# Patient Record
Sex: MALE | Race: WHITE | Employment: FULL TIME | URBAN - METROPOLITAN AREA
[De-identification: names, ages, dates, MRNs, and addresses within clinical notes are randomized per-mention and may not be internally consistent; named-entity substitution may affect disease eponyms.]

---

## 2018-03-02 ENCOUNTER — APPOINTMENT (OUTPATIENT)
Dept: CARDIOLOGY | Facility: CLINIC | Age: 63
DRG: 246 | End: 2018-03-02
Attending: INTERNAL MEDICINE
Payer: COMMERCIAL

## 2018-03-02 ENCOUNTER — APPOINTMENT (OUTPATIENT)
Dept: CARDIOLOGY | Facility: CLINIC | Age: 63
DRG: 311 | End: 2018-03-02
Attending: PHYSICIAN ASSISTANT
Payer: COMMERCIAL

## 2018-03-02 ENCOUNTER — HOSPITAL ENCOUNTER (INPATIENT)
Facility: CLINIC | Age: 63
LOS: 1 days | Discharge: ANOTHER HOSPITAL | DRG: 311 | End: 2018-03-02
Attending: EMERGENCY MEDICINE | Admitting: INTERNAL MEDICINE
Payer: COMMERCIAL

## 2018-03-02 ENCOUNTER — HOSPITAL ENCOUNTER (INPATIENT)
Facility: CLINIC | Age: 63
LOS: 1 days | Discharge: HOME | DRG: 246 | End: 2018-03-03
Attending: HOSPITALIST | Admitting: HOSPITALIST
Payer: COMMERCIAL

## 2018-03-02 ENCOUNTER — APPOINTMENT (OUTPATIENT)
Dept: GENERAL RADIOLOGY | Facility: CLINIC | Age: 63
DRG: 311 | End: 2018-03-02
Attending: EMERGENCY MEDICINE
Payer: COMMERCIAL

## 2018-03-02 VITALS
TEMPERATURE: 97.9 F | OXYGEN SATURATION: 95 % | SYSTOLIC BLOOD PRESSURE: 110 MMHG | HEIGHT: 68 IN | BODY MASS INDEX: 30.08 KG/M2 | WEIGHT: 198.5 LBS | RESPIRATION RATE: 18 BRPM | DIASTOLIC BLOOD PRESSURE: 77 MMHG

## 2018-03-02 DIAGNOSIS — I20.0 UNSTABLE ANGINA (H): ICD-10-CM

## 2018-03-02 DIAGNOSIS — I21.4 NSTEMI (NON-ST ELEVATED MYOCARDIAL INFARCTION) (H): ICD-10-CM

## 2018-03-02 DIAGNOSIS — R07.9 CHEST PAIN, UNSPECIFIED TYPE: ICD-10-CM

## 2018-03-02 DIAGNOSIS — Z98.61 POSTSURGICAL PERCUTANEOUS TRANSLUMINAL CORONARY ANGIOPLASTY STATUS: Primary | ICD-10-CM

## 2018-03-02 DIAGNOSIS — R07.89 OTHER CHEST PAIN: ICD-10-CM

## 2018-03-02 PROBLEM — I24.9 ACS (ACUTE CORONARY SYNDROME) (H): Status: ACTIVE | Noted: 2018-03-02

## 2018-03-02 LAB
ANION GAP SERPL CALCULATED.3IONS-SCNC: 3 MMOL/L (ref 3–14)
BASOPHILS # BLD AUTO: 0 10E9/L (ref 0–0.2)
BASOPHILS NFR BLD AUTO: 0.7 %
BUN SERPL-MCNC: 20 MG/DL (ref 7–30)
CALCIUM SERPL-MCNC: 9.3 MG/DL (ref 8.5–10.1)
CHLORIDE SERPL-SCNC: 107 MMOL/L (ref 94–109)
CO2 SERPL-SCNC: 31 MMOL/L (ref 20–32)
CREAT SERPL-MCNC: 0.99 MG/DL (ref 0.66–1.25)
DIFFERENTIAL METHOD BLD: NORMAL
EOSINOPHIL # BLD AUTO: 0.2 10E9/L (ref 0–0.7)
EOSINOPHIL NFR BLD AUTO: 3.8 %
ERYTHROCYTE [DISTWIDTH] IN BLOOD BY AUTOMATED COUNT: 13.1 % (ref 10–15)
ERYTHROCYTE [DISTWIDTH] IN BLOOD BY AUTOMATED COUNT: 13.1 % (ref 10–15)
GFR SERPL CREATININE-BSD FRML MDRD: 77 ML/MIN/1.7M2
GLUCOSE SERPL-MCNC: 92 MG/DL (ref 70–99)
HCT VFR BLD AUTO: 44.7 % (ref 40–53)
HCT VFR BLD AUTO: 47.4 % (ref 40–53)
HGB BLD-MCNC: 14.5 G/DL (ref 13.3–17.7)
HGB BLD-MCNC: 15.5 G/DL (ref 13.3–17.7)
IMM GRANULOCYTES # BLD: 0 10E9/L (ref 0–0.4)
IMM GRANULOCYTES NFR BLD: 0.2 %
INTERPRETATION ECG - MUSE: NORMAL
INTERPRETATION ECG - MUSE: NORMAL
KCT BLD-ACNC: 242 SEC (ref 75–150)
KCT BLD-ACNC: 248 SEC (ref 75–150)
KCT BLD-ACNC: 254 SEC (ref 75–150)
KCT BLD-ACNC: 272 SEC (ref 75–150)
KCT BLD-ACNC: 361 SEC (ref 75–150)
LYMPHOCYTES # BLD AUTO: 1.8 10E9/L (ref 0.8–5.3)
LYMPHOCYTES NFR BLD AUTO: 30.1 %
MCH RBC QN AUTO: 28.9 PG (ref 26.5–33)
MCH RBC QN AUTO: 28.9 PG (ref 26.5–33)
MCHC RBC AUTO-ENTMCNC: 32.4 G/DL (ref 31.5–36.5)
MCHC RBC AUTO-ENTMCNC: 32.7 G/DL (ref 31.5–36.5)
MCV RBC AUTO: 88 FL (ref 78–100)
MCV RBC AUTO: 89 FL (ref 78–100)
MONOCYTES # BLD AUTO: 0.6 10E9/L (ref 0–1.3)
MONOCYTES NFR BLD AUTO: 9.7 %
NEUTROPHILS # BLD AUTO: 3.3 10E9/L (ref 1.6–8.3)
NEUTROPHILS NFR BLD AUTO: 55.5 %
NRBC # BLD AUTO: 0 10*3/UL
NRBC BLD AUTO-RTO: 0 /100
PLATELET # BLD AUTO: 282 10E9/L (ref 150–450)
PLATELET # BLD AUTO: 314 10E9/L (ref 150–450)
POTASSIUM SERPL-SCNC: 4 MMOL/L (ref 3.4–5.3)
RBC # BLD AUTO: 5.02 10E12/L (ref 4.4–5.9)
RBC # BLD AUTO: 5.36 10E12/L (ref 4.4–5.9)
SODIUM SERPL-SCNC: 141 MMOL/L (ref 133–144)
TROPONIN I BLD-MCNC: 0.01 UG/L (ref 0–0.1)
TROPONIN I SERPL-MCNC: 0.06 UG/L (ref 0–0.04)
TROPONIN I SERPL-MCNC: 0.08 UG/L (ref 0–0.04)
WBC # BLD AUTO: 5.6 10E9/L (ref 4–11)
WBC # BLD AUTO: 6 10E9/L (ref 4–11)

## 2018-03-02 PROCEDURE — C1874 STENT, COATED/COV W/DEL SYS: HCPCS

## 2018-03-02 PROCEDURE — 80048 BASIC METABOLIC PNL TOTAL CA: CPT | Performed by: EMERGENCY MEDICINE

## 2018-03-02 PROCEDURE — 27211089 ZZH KIT ACIST INJECTOR CR3

## 2018-03-02 PROCEDURE — 27210744 ZZH CATH CR12

## 2018-03-02 PROCEDURE — 25000132 ZZH RX MED GY IP 250 OP 250 PS 637: Performed by: EMERGENCY MEDICINE

## 2018-03-02 PROCEDURE — 93458 L HRT ARTERY/VENTRICLE ANGIO: CPT | Mod: XU

## 2018-03-02 PROCEDURE — 99207 ZZC CDG-MDM COMPONENT: MEETS MODERATE - UP CODED: CPT | Performed by: HOSPITALIST

## 2018-03-02 PROCEDURE — 84484 ASSAY OF TROPONIN QUANT: CPT | Performed by: PHYSICIAN ASSISTANT

## 2018-03-02 PROCEDURE — C1769 GUIDE WIRE: HCPCS

## 2018-03-02 PROCEDURE — 99152 MOD SED SAME PHYS/QHP 5/>YRS: CPT | Mod: GC | Performed by: INTERNAL MEDICINE

## 2018-03-02 PROCEDURE — 84484 ASSAY OF TROPONIN QUANT: CPT

## 2018-03-02 PROCEDURE — 93005 ELECTROCARDIOGRAM TRACING: CPT

## 2018-03-02 PROCEDURE — 85027 COMPLETE CBC AUTOMATED: CPT | Performed by: PHYSICIAN ASSISTANT

## 2018-03-02 PROCEDURE — C9600 PERC DRUG-EL COR STENT SING: HCPCS

## 2018-03-02 PROCEDURE — 25000132 ZZH RX MED GY IP 250 OP 250 PS 637

## 2018-03-02 PROCEDURE — 27210787 ZZH MANIFOLD CR2

## 2018-03-02 PROCEDURE — 99152 MOD SED SAME PHYS/QHP 5/>YRS: CPT

## 2018-03-02 PROCEDURE — 99223 1ST HOSP IP/OBS HIGH 75: CPT | Mod: 24 | Performed by: INTERNAL MEDICINE

## 2018-03-02 PROCEDURE — 25000128 H RX IP 250 OP 636: Performed by: INTERNAL MEDICINE

## 2018-03-02 PROCEDURE — 96365 THER/PROPH/DIAG IV INF INIT: CPT

## 2018-03-02 PROCEDURE — 93306 TTE W/DOPPLER COMPLETE: CPT | Mod: 26 | Performed by: INTERNAL MEDICINE

## 2018-03-02 PROCEDURE — 84484 ASSAY OF TROPONIN QUANT: CPT | Performed by: EMERGENCY MEDICINE

## 2018-03-02 PROCEDURE — 85347 COAGULATION TIME ACTIVATED: CPT

## 2018-03-02 PROCEDURE — 027037Z DILATION OF CORONARY ARTERY, ONE ARTERY WITH FOUR OR MORE DRUG-ELUTING INTRALUMINAL DEVICES, PERCUTANEOUS APPROACH: ICD-10-PCS | Performed by: INTERNAL MEDICINE

## 2018-03-02 PROCEDURE — 27210998 ZZH ACCESS HEART CATH CR6

## 2018-03-02 PROCEDURE — 27210759 ZZH DEVICE INFLATION CR6

## 2018-03-02 PROCEDURE — 92928 PRQ TCAT PLMT NTRAC ST 1 LES: CPT | Mod: RC | Performed by: INTERNAL MEDICINE

## 2018-03-02 PROCEDURE — 85025 COMPLETE CBC W/AUTO DIFF WBC: CPT | Performed by: EMERGENCY MEDICINE

## 2018-03-02 PROCEDURE — C1725 CATH, TRANSLUMIN NON-LASER: HCPCS

## 2018-03-02 PROCEDURE — 99153 MOD SED SAME PHYS/QHP EA: CPT

## 2018-03-02 PROCEDURE — 25000128 H RX IP 250 OP 636: Performed by: EMERGENCY MEDICINE

## 2018-03-02 PROCEDURE — 21000000 ZZH R&B IMCU HEART CARE

## 2018-03-02 PROCEDURE — 27210795 ZZH PAD DEFIB QUICK CR4

## 2018-03-02 PROCEDURE — 36415 COLL VENOUS BLD VENIPUNCTURE: CPT | Performed by: PHYSICIAN ASSISTANT

## 2018-03-02 PROCEDURE — 96375 TX/PRO/DX INJ NEW DRUG ADDON: CPT

## 2018-03-02 PROCEDURE — 99223 1ST HOSP IP/OBS HIGH 75: CPT | Mod: AI | Performed by: HOSPITALIST

## 2018-03-02 PROCEDURE — 27210946 ZZH KIT HC TOTES DISP CR8

## 2018-03-02 PROCEDURE — 25000128 H RX IP 250 OP 636

## 2018-03-02 PROCEDURE — 99285 EMERGENCY DEPT VISIT HI MDM: CPT | Mod: 25

## 2018-03-02 PROCEDURE — 93458 L HRT ARTERY/VENTRICLE ANGIO: CPT | Mod: 26 | Performed by: INTERNAL MEDICINE

## 2018-03-02 PROCEDURE — 27210856 ZZH ACCESS HEART CATH CR2

## 2018-03-02 PROCEDURE — 4A023N7 MEASUREMENT OF CARDIAC SAMPLING AND PRESSURE, LEFT HEART, PERCUTANEOUS APPROACH: ICD-10-PCS | Performed by: INTERNAL MEDICINE

## 2018-03-02 PROCEDURE — 12000007 ZZH R&B INTERMEDIATE

## 2018-03-02 PROCEDURE — 93306 TTE W/DOPPLER COMPLETE: CPT

## 2018-03-02 PROCEDURE — 25000132 ZZH RX MED GY IP 250 OP 250 PS 637: Performed by: PHYSICIAN ASSISTANT

## 2018-03-02 PROCEDURE — 93005 ELECTROCARDIOGRAM TRACING: CPT | Mod: 76

## 2018-03-02 PROCEDURE — 25000132 ZZH RX MED GY IP 250 OP 250 PS 637: Performed by: INTERNAL MEDICINE

## 2018-03-02 PROCEDURE — C1887 CATHETER, GUIDING: HCPCS

## 2018-03-02 PROCEDURE — B2111ZZ FLUOROSCOPY OF MULTIPLE CORONARY ARTERIES USING LOW OSMOLAR CONTRAST: ICD-10-PCS | Performed by: INTERNAL MEDICINE

## 2018-03-02 PROCEDURE — 71045 X-RAY EXAM CHEST 1 VIEW: CPT

## 2018-03-02 RX ORDER — NITROGLYCERIN 5 MG/ML
100-500 VIAL (ML) INTRAVENOUS
Status: DISCONTINUED | OUTPATIENT
Start: 2018-03-02 | End: 2018-03-02 | Stop reason: HOSPADM

## 2018-03-02 RX ORDER — NITROGLYCERIN 0.4 MG/1
0.4 TABLET SUBLINGUAL EVERY 5 MIN PRN
Status: DISCONTINUED | OUTPATIENT
Start: 2018-03-02 | End: 2018-03-03 | Stop reason: HOSPADM

## 2018-03-02 RX ORDER — POTASSIUM CHLORIDE 29.8 MG/ML
20 INJECTION INTRAVENOUS
Status: DISCONTINUED | OUTPATIENT
Start: 2018-03-02 | End: 2018-03-02 | Stop reason: HOSPADM

## 2018-03-02 RX ORDER — POTASSIUM CHLORIDE 1500 MG/1
20 TABLET, EXTENDED RELEASE ORAL
Status: DISCONTINUED | OUTPATIENT
Start: 2018-03-02 | End: 2018-03-02 | Stop reason: HOSPADM

## 2018-03-02 RX ORDER — ASPIRIN 81 MG/1
81-324 TABLET, CHEWABLE ORAL
Status: DISCONTINUED | OUTPATIENT
Start: 2018-03-02 | End: 2018-03-02 | Stop reason: HOSPADM

## 2018-03-02 RX ORDER — AMOXICILLIN 250 MG
2 CAPSULE ORAL 2 TIMES DAILY PRN
Status: DISCONTINUED | OUTPATIENT
Start: 2018-03-02 | End: 2018-03-02 | Stop reason: HOSPADM

## 2018-03-02 RX ORDER — AMOXICILLIN 250 MG
1 CAPSULE ORAL 2 TIMES DAILY PRN
Status: DISCONTINUED | OUTPATIENT
Start: 2018-03-02 | End: 2018-03-02 | Stop reason: HOSPADM

## 2018-03-02 RX ORDER — NITROGLYCERIN 0.4 MG/1
0.4 TABLET SUBLINGUAL EVERY 5 MIN PRN
Status: DISCONTINUED | OUTPATIENT
Start: 2018-03-02 | End: 2018-03-02

## 2018-03-02 RX ORDER — NALOXONE HYDROCHLORIDE 0.4 MG/ML
.2-.4 INJECTION, SOLUTION INTRAMUSCULAR; INTRAVENOUS; SUBCUTANEOUS
Status: DISCONTINUED | OUTPATIENT
Start: 2018-03-02 | End: 2018-03-03

## 2018-03-02 RX ORDER — LIDOCAINE 40 MG/G
CREAM TOPICAL
Status: DISCONTINUED | OUTPATIENT
Start: 2018-03-02 | End: 2018-03-03 | Stop reason: HOSPADM

## 2018-03-02 RX ORDER — NICARDIPINE HYDROCHLORIDE 2.5 MG/ML
100 INJECTION INTRAVENOUS
Status: DISCONTINUED | OUTPATIENT
Start: 2018-03-02 | End: 2018-03-02 | Stop reason: HOSPADM

## 2018-03-02 RX ORDER — BUPIVACAINE HYDROCHLORIDE 2.5 MG/ML
1-10 INJECTION, SOLUTION EPIDURAL; INFILTRATION; INTRACAUDAL
Status: DISCONTINUED | OUTPATIENT
Start: 2018-03-02 | End: 2018-03-02 | Stop reason: HOSPADM

## 2018-03-02 RX ORDER — FLUMAZENIL 0.1 MG/ML
0.2 INJECTION, SOLUTION INTRAVENOUS
Status: DISCONTINUED | OUTPATIENT
Start: 2018-03-02 | End: 2018-03-02 | Stop reason: HOSPADM

## 2018-03-02 RX ORDER — SODIUM CHLORIDE 9 MG/ML
INJECTION, SOLUTION INTRAVENOUS CONTINUOUS
Status: ACTIVE | OUTPATIENT
Start: 2018-03-02 | End: 2018-03-03

## 2018-03-02 RX ORDER — ACETAMINOPHEN 325 MG/1
325-650 TABLET ORAL EVERY 4 HOURS PRN
Status: DISCONTINUED | OUTPATIENT
Start: 2018-03-02 | End: 2018-03-03 | Stop reason: HOSPADM

## 2018-03-02 RX ORDER — LISINOPRIL 10 MG/1
10 TABLET ORAL DAILY
Status: DISCONTINUED | OUTPATIENT
Start: 2018-03-02 | End: 2018-03-02 | Stop reason: HOSPADM

## 2018-03-02 RX ORDER — PROCHLORPERAZINE MALEATE 10 MG
10 TABLET ORAL EVERY 6 HOURS PRN
Status: DISCONTINUED | OUTPATIENT
Start: 2018-03-02 | End: 2018-03-02 | Stop reason: HOSPADM

## 2018-03-02 RX ORDER — ENALAPRILAT 1.25 MG/ML
1.25-2.5 INJECTION INTRAVENOUS
Status: DISCONTINUED | OUTPATIENT
Start: 2018-03-02 | End: 2018-03-02 | Stop reason: HOSPADM

## 2018-03-02 RX ORDER — ACETAMINOPHEN 325 MG/1
650 TABLET ORAL EVERY 4 HOURS PRN
Status: DISCONTINUED | OUTPATIENT
Start: 2018-03-02 | End: 2018-03-02 | Stop reason: HOSPADM

## 2018-03-02 RX ORDER — FUROSEMIDE 10 MG/ML
20-100 INJECTION INTRAMUSCULAR; INTRAVENOUS
Status: DISCONTINUED | OUTPATIENT
Start: 2018-03-02 | End: 2018-03-02 | Stop reason: HOSPADM

## 2018-03-02 RX ORDER — MULTIPLE VITAMINS W/ MINERALS TAB 9MG-400MCG
1 TAB ORAL DAILY
COMMUNITY

## 2018-03-02 RX ORDER — LORAZEPAM 2 MG/ML
.5-2 INJECTION INTRAMUSCULAR EVERY 4 HOURS PRN
Status: DISCONTINUED | OUTPATIENT
Start: 2018-03-02 | End: 2018-03-02 | Stop reason: HOSPADM

## 2018-03-02 RX ORDER — ASPIRIN 81 MG/1
324 TABLET, CHEWABLE ORAL ONCE
Status: COMPLETED | OUTPATIENT
Start: 2018-03-02 | End: 2018-03-02

## 2018-03-02 RX ORDER — CARVEDILOL 6.25 MG/1
6.25 TABLET ORAL 2 TIMES DAILY
Status: DISCONTINUED | OUTPATIENT
Start: 2018-03-02 | End: 2018-03-02 | Stop reason: HOSPADM

## 2018-03-02 RX ORDER — METOPROLOL SUCCINATE 25 MG/1
25 TABLET, EXTENDED RELEASE ORAL DAILY
Status: DISCONTINUED | OUTPATIENT
Start: 2018-03-03 | End: 2018-03-03 | Stop reason: HOSPADM

## 2018-03-02 RX ORDER — ALUMINA, MAGNESIA, AND SIMETHICONE 2400; 2400; 240 MG/30ML; MG/30ML; MG/30ML
30 SUSPENSION ORAL EVERY 4 HOURS PRN
Status: DISCONTINUED | OUTPATIENT
Start: 2018-03-02 | End: 2018-03-02 | Stop reason: HOSPADM

## 2018-03-02 RX ORDER — ONDANSETRON 4 MG/1
4 TABLET, ORALLY DISINTEGRATING ORAL EVERY 6 HOURS PRN
Status: DISCONTINUED | OUTPATIENT
Start: 2018-03-02 | End: 2018-03-02 | Stop reason: HOSPADM

## 2018-03-02 RX ORDER — NITROGLYCERIN 20 MG/100ML
.07-2 INJECTION INTRAVENOUS CONTINUOUS PRN
Status: DISCONTINUED | OUTPATIENT
Start: 2018-03-02 | End: 2018-03-02 | Stop reason: HOSPADM

## 2018-03-02 RX ORDER — CLOPIDOGREL 300 MG/1
300-600 TABLET, FILM COATED ORAL
Status: DISCONTINUED | OUTPATIENT
Start: 2018-03-02 | End: 2018-03-02 | Stop reason: HOSPADM

## 2018-03-02 RX ORDER — LIDOCAINE 40 MG/G
CREAM TOPICAL
Status: DISCONTINUED | OUTPATIENT
Start: 2018-03-02 | End: 2018-03-02 | Stop reason: HOSPADM

## 2018-03-02 RX ORDER — ADENOSINE 3 MG/ML
12-12000 INJECTION, SOLUTION INTRAVENOUS
Status: DISCONTINUED | OUTPATIENT
Start: 2018-03-02 | End: 2018-03-02 | Stop reason: HOSPADM

## 2018-03-02 RX ORDER — NITROGLYCERIN 5 MG/ML
VIAL (ML) INTRAVENOUS
Status: DISCONTINUED
Start: 2018-03-02 | End: 2018-03-02 | Stop reason: HOSPADM

## 2018-03-02 RX ORDER — SIMVASTATIN 20 MG
20 TABLET ORAL EVERY EVENING
Status: DISCONTINUED | OUTPATIENT
Start: 2018-03-02 | End: 2018-03-02 | Stop reason: HOSPADM

## 2018-03-02 RX ORDER — ATORVASTATIN CALCIUM 40 MG/1
40 TABLET, FILM COATED ORAL DAILY
Status: DISCONTINUED | OUTPATIENT
Start: 2018-03-03 | End: 2018-03-03

## 2018-03-02 RX ORDER — HEPARIN SODIUM 1000 [USP'U]/ML
INJECTION, SOLUTION INTRAVENOUS; SUBCUTANEOUS
Status: DISCONTINUED
Start: 2018-03-02 | End: 2018-03-02 | Stop reason: HOSPADM

## 2018-03-02 RX ORDER — NALOXONE HYDROCHLORIDE 0.4 MG/ML
.1-.4 INJECTION, SOLUTION INTRAMUSCULAR; INTRAVENOUS; SUBCUTANEOUS
Status: DISCONTINUED | OUTPATIENT
Start: 2018-03-02 | End: 2018-03-03 | Stop reason: HOSPADM

## 2018-03-02 RX ORDER — NIFEDIPINE 10 MG/1
10 CAPSULE ORAL
Status: DISCONTINUED | OUTPATIENT
Start: 2018-03-02 | End: 2018-03-02 | Stop reason: HOSPADM

## 2018-03-02 RX ORDER — ASPIRIN 81 MG/1
81 TABLET ORAL DAILY
Status: DISCONTINUED | OUTPATIENT
Start: 2018-03-03 | End: 2018-03-03 | Stop reason: HOSPADM

## 2018-03-02 RX ORDER — PROMETHAZINE HYDROCHLORIDE 25 MG/ML
6.25-25 INJECTION, SOLUTION INTRAMUSCULAR; INTRAVENOUS EVERY 4 HOURS PRN
Status: DISCONTINUED | OUTPATIENT
Start: 2018-03-02 | End: 2018-03-02 | Stop reason: HOSPADM

## 2018-03-02 RX ORDER — CLOPIDOGREL BISULFATE 75 MG/1
75 TABLET ORAL
Status: DISCONTINUED | OUTPATIENT
Start: 2018-03-02 | End: 2018-03-02 | Stop reason: HOSPADM

## 2018-03-02 RX ORDER — HEPARIN SODIUM 1000 [USP'U]/ML
1000-10000 INJECTION, SOLUTION INTRAVENOUS; SUBCUTANEOUS EVERY 5 MIN PRN
Status: DISCONTINUED | OUTPATIENT
Start: 2018-03-02 | End: 2018-03-02 | Stop reason: HOSPADM

## 2018-03-02 RX ORDER — ONDANSETRON 2 MG/ML
4 INJECTION INTRAMUSCULAR; INTRAVENOUS EVERY 4 HOURS PRN
Status: DISCONTINUED | OUTPATIENT
Start: 2018-03-02 | End: 2018-03-02 | Stop reason: HOSPADM

## 2018-03-02 RX ORDER — IOPAMIDOL 755 MG/ML
280 INJECTION, SOLUTION INTRAVASCULAR ONCE
Status: COMPLETED | OUTPATIENT
Start: 2018-03-02 | End: 2018-03-02

## 2018-03-02 RX ORDER — NITROGLYCERIN 0.4 MG/1
0.4 TABLET SUBLINGUAL EVERY 5 MIN PRN
Status: DISCONTINUED | OUTPATIENT
Start: 2018-03-02 | End: 2018-03-02 | Stop reason: HOSPADM

## 2018-03-02 RX ORDER — HYDRALAZINE HYDROCHLORIDE 20 MG/ML
10-20 INJECTION INTRAMUSCULAR; INTRAVENOUS
Status: DISCONTINUED | OUTPATIENT
Start: 2018-03-02 | End: 2018-03-02 | Stop reason: HOSPADM

## 2018-03-02 RX ORDER — ASPIRIN 325 MG
325 TABLET ORAL DAILY
Status: DISCONTINUED | OUTPATIENT
Start: 2018-03-03 | End: 2018-03-02

## 2018-03-02 RX ORDER — POTASSIUM CHLORIDE 7.45 MG/ML
10 INJECTION INTRAVENOUS
Status: DISCONTINUED | OUTPATIENT
Start: 2018-03-02 | End: 2018-03-02 | Stop reason: HOSPADM

## 2018-03-02 RX ORDER — POLYETHYLENE GLYCOL 3350 17 G/17G
17 POWDER, FOR SOLUTION ORAL DAILY PRN
Status: DISCONTINUED | OUTPATIENT
Start: 2018-03-02 | End: 2018-03-02 | Stop reason: HOSPADM

## 2018-03-02 RX ORDER — NITROGLYCERIN 5 MG/ML
100-200 VIAL (ML) INTRAVENOUS
Status: DISCONTINUED | OUTPATIENT
Start: 2018-03-02 | End: 2018-03-02 | Stop reason: HOSPADM

## 2018-03-02 RX ORDER — ACETAMINOPHEN 650 MG/1
650 SUPPOSITORY RECTAL EVERY 4 HOURS PRN
Status: DISCONTINUED | OUTPATIENT
Start: 2018-03-02 | End: 2018-03-02 | Stop reason: HOSPADM

## 2018-03-02 RX ORDER — LORAZEPAM 2 MG/ML
0.5 INJECTION INTRAMUSCULAR
Status: DISCONTINUED | OUTPATIENT
Start: 2018-03-02 | End: 2018-03-02 | Stop reason: HOSPADM

## 2018-03-02 RX ORDER — DOPAMINE HYDROCHLORIDE 160 MG/100ML
2-20 INJECTION, SOLUTION INTRAVENOUS CONTINUOUS PRN
Status: DISCONTINUED | OUTPATIENT
Start: 2018-03-02 | End: 2018-03-02 | Stop reason: HOSPADM

## 2018-03-02 RX ORDER — EPINEPHRINE 1 MG/ML
0.3 INJECTION, SOLUTION, CONCENTRATE INTRAVENOUS
Status: DISCONTINUED | OUTPATIENT
Start: 2018-03-02 | End: 2018-03-02 | Stop reason: HOSPADM

## 2018-03-02 RX ORDER — LORAZEPAM 0.5 MG/1
0.5 TABLET ORAL
Status: DISCONTINUED | OUTPATIENT
Start: 2018-03-02 | End: 2018-03-02 | Stop reason: HOSPADM

## 2018-03-02 RX ORDER — LIDOCAINE HYDROCHLORIDE 10 MG/ML
30 INJECTION, SOLUTION EPIDURAL; INFILTRATION; INTRACAUDAL; PERINEURAL
Status: DISCONTINUED | OUTPATIENT
Start: 2018-03-02 | End: 2018-03-02 | Stop reason: HOSPADM

## 2018-03-02 RX ORDER — ONDANSETRON 2 MG/ML
4 INJECTION INTRAMUSCULAR; INTRAVENOUS EVERY 6 HOURS PRN
Status: DISCONTINUED | OUTPATIENT
Start: 2018-03-02 | End: 2018-03-02 | Stop reason: HOSPADM

## 2018-03-02 RX ORDER — FENTANYL CITRATE 50 UG/ML
25-50 INJECTION, SOLUTION INTRAMUSCULAR; INTRAVENOUS
Status: DISCONTINUED | OUTPATIENT
Start: 2018-03-02 | End: 2018-03-02 | Stop reason: HOSPADM

## 2018-03-02 RX ORDER — NALOXONE HYDROCHLORIDE 0.4 MG/ML
.1-.4 INJECTION, SOLUTION INTRAMUSCULAR; INTRAVENOUS; SUBCUTANEOUS
Status: DISCONTINUED | OUTPATIENT
Start: 2018-03-02 | End: 2018-03-02 | Stop reason: HOSPADM

## 2018-03-02 RX ORDER — PROTAMINE SULFATE 10 MG/ML
25-100 INJECTION, SOLUTION INTRAVENOUS EVERY 5 MIN PRN
Status: DISCONTINUED | OUTPATIENT
Start: 2018-03-02 | End: 2018-03-02 | Stop reason: HOSPADM

## 2018-03-02 RX ORDER — LIDOCAINE HYDROCHLORIDE 10 MG/ML
1-10 INJECTION, SOLUTION EPIDURAL; INFILTRATION; INTRACAUDAL; PERINEURAL
Status: DISCONTINUED | OUTPATIENT
Start: 2018-03-02 | End: 2018-03-02 | Stop reason: HOSPADM

## 2018-03-02 RX ORDER — NICOTINE POLACRILEX 4 MG/1
20 GUM, CHEWING ORAL DAILY
COMMUNITY

## 2018-03-02 RX ORDER — PROTAMINE SULFATE 10 MG/ML
1-5 INJECTION, SOLUTION INTRAVENOUS
Status: DISCONTINUED | OUTPATIENT
Start: 2018-03-02 | End: 2018-03-02 | Stop reason: HOSPADM

## 2018-03-02 RX ORDER — ATROPINE SULFATE 0.1 MG/ML
.5-1 INJECTION INTRAVENOUS
Status: DISCONTINUED | OUTPATIENT
Start: 2018-03-02 | End: 2018-03-02 | Stop reason: HOSPADM

## 2018-03-02 RX ORDER — METOPROLOL TARTRATE 1 MG/ML
5 INJECTION, SOLUTION INTRAVENOUS EVERY 5 MIN PRN
Status: DISCONTINUED | OUTPATIENT
Start: 2018-03-02 | End: 2018-03-02 | Stop reason: HOSPADM

## 2018-03-02 RX ORDER — HYDROCODONE BITARTRATE AND ACETAMINOPHEN 5; 325 MG/1; MG/1
1-2 TABLET ORAL EVERY 4 HOURS PRN
Status: DISCONTINUED | OUTPATIENT
Start: 2018-03-02 | End: 2018-03-03 | Stop reason: HOSPADM

## 2018-03-02 RX ORDER — DEXTROSE MONOHYDRATE 25 G/50ML
12.5-5 INJECTION, SOLUTION INTRAVENOUS EVERY 30 MIN PRN
Status: DISCONTINUED | OUTPATIENT
Start: 2018-03-02 | End: 2018-03-02 | Stop reason: HOSPADM

## 2018-03-02 RX ORDER — MORPHINE SULFATE 2 MG/ML
1-2 INJECTION, SOLUTION INTRAMUSCULAR; INTRAVENOUS EVERY 5 MIN PRN
Status: DISCONTINUED | OUTPATIENT
Start: 2018-03-02 | End: 2018-03-02 | Stop reason: HOSPADM

## 2018-03-02 RX ORDER — PHENYLEPHRINE HCL IN 0.9% NACL 1 MG/10 ML
20-100 SYRINGE (ML) INTRAVENOUS
Status: DISCONTINUED | OUTPATIENT
Start: 2018-03-02 | End: 2018-03-02 | Stop reason: HOSPADM

## 2018-03-02 RX ORDER — SODIUM NITROPRUSSIDE 25 MG/ML
100-200 INJECTION INTRAVENOUS
Status: DISCONTINUED | OUTPATIENT
Start: 2018-03-02 | End: 2018-03-02 | Stop reason: HOSPADM

## 2018-03-02 RX ORDER — NITROGLYCERIN 0.4 MG/1
TABLET SUBLINGUAL
Status: COMPLETED
Start: 2018-03-02 | End: 2018-03-02

## 2018-03-02 RX ORDER — VERAPAMIL HYDROCHLORIDE 2.5 MG/ML
INJECTION, SOLUTION INTRAVENOUS
Status: DISCONTINUED
Start: 2018-03-02 | End: 2018-03-02 | Stop reason: WASHOUT

## 2018-03-02 RX ORDER — FENTANYL CITRATE 50 UG/ML
INJECTION, SOLUTION INTRAMUSCULAR; INTRAVENOUS
Status: DISCONTINUED
Start: 2018-03-02 | End: 2018-03-02 | Stop reason: WASHOUT

## 2018-03-02 RX ORDER — DOBUTAMINE HYDROCHLORIDE 200 MG/100ML
2-20 INJECTION INTRAVENOUS CONTINUOUS PRN
Status: DISCONTINUED | OUTPATIENT
Start: 2018-03-02 | End: 2018-03-02 | Stop reason: HOSPADM

## 2018-03-02 RX ORDER — PROCHLORPERAZINE 25 MG
25 SUPPOSITORY, RECTAL RECTAL EVERY 12 HOURS PRN
Status: DISCONTINUED | OUTPATIENT
Start: 2018-03-02 | End: 2018-03-02 | Stop reason: HOSPADM

## 2018-03-02 RX ORDER — CALCIUM CARBONATE 500 MG/1
2 TABLET, CHEWABLE ORAL PRN
COMMUNITY

## 2018-03-02 RX ORDER — METHYLPREDNISOLONE SODIUM SUCCINATE 125 MG/2ML
125 INJECTION, POWDER, LYOPHILIZED, FOR SOLUTION INTRAMUSCULAR; INTRAVENOUS
Status: DISCONTINUED | OUTPATIENT
Start: 2018-03-02 | End: 2018-03-02 | Stop reason: HOSPADM

## 2018-03-02 RX ORDER — MORPHINE SULFATE 4 MG/ML
4 INJECTION, SOLUTION INTRAMUSCULAR; INTRAVENOUS
Status: DISCONTINUED | OUTPATIENT
Start: 2018-03-02 | End: 2018-03-02

## 2018-03-02 RX ORDER — NICOTINE POLACRILEX 4 MG/1
20 GUM, CHEWING ORAL DAILY
Status: DISCONTINUED | OUTPATIENT
Start: 2018-03-02 | End: 2018-03-02 | Stop reason: HOSPADM

## 2018-03-02 RX ORDER — VERAPAMIL HYDROCHLORIDE 2.5 MG/ML
1-2.5 INJECTION, SOLUTION INTRAVENOUS
Status: DISCONTINUED | OUTPATIENT
Start: 2018-03-02 | End: 2018-03-02 | Stop reason: HOSPADM

## 2018-03-02 RX ORDER — CHLORAL HYDRATE 500 MG
2 CAPSULE ORAL DAILY
COMMUNITY

## 2018-03-02 RX ORDER — PRASUGREL 10 MG/1
10-60 TABLET, FILM COATED ORAL
Status: DISCONTINUED | OUTPATIENT
Start: 2018-03-02 | End: 2018-03-02 | Stop reason: HOSPADM

## 2018-03-02 RX ORDER — FLUMAZENIL 0.1 MG/ML
0.2 INJECTION, SOLUTION INTRAVENOUS
Status: ACTIVE | OUTPATIENT
Start: 2018-03-02 | End: 2018-03-03

## 2018-03-02 RX ORDER — NALOXONE HYDROCHLORIDE 0.4 MG/ML
0.4 INJECTION, SOLUTION INTRAMUSCULAR; INTRAVENOUS; SUBCUTANEOUS EVERY 5 MIN PRN
Status: DISCONTINUED | OUTPATIENT
Start: 2018-03-02 | End: 2018-03-02 | Stop reason: HOSPADM

## 2018-03-02 RX ORDER — ASPIRIN 81 MG/1
324 TABLET, CHEWABLE ORAL ONCE
Status: DISCONTINUED | OUTPATIENT
Start: 2018-03-02 | End: 2018-03-02 | Stop reason: HOSPADM

## 2018-03-02 RX ORDER — ASPIRIN 325 MG
325 TABLET ORAL
Status: DISCONTINUED | OUTPATIENT
Start: 2018-03-02 | End: 2018-03-02 | Stop reason: HOSPADM

## 2018-03-02 RX ORDER — SODIUM CHLORIDE 9 MG/ML
INJECTION, SOLUTION INTRAVENOUS CONTINUOUS
Status: DISCONTINUED | OUTPATIENT
Start: 2018-03-02 | End: 2018-03-02 | Stop reason: HOSPADM

## 2018-03-02 RX ORDER — FENTANYL CITRATE 50 UG/ML
25-50 INJECTION, SOLUTION INTRAMUSCULAR; INTRAVENOUS
Status: ACTIVE | OUTPATIENT
Start: 2018-03-02 | End: 2018-03-03

## 2018-03-02 RX ORDER — ATROPINE SULFATE 0.1 MG/ML
0.5 INJECTION INTRAVENOUS EVERY 5 MIN PRN
Status: ACTIVE | OUTPATIENT
Start: 2018-03-02 | End: 2018-03-03

## 2018-03-02 RX ORDER — DIPHENHYDRAMINE HYDROCHLORIDE 50 MG/ML
25-50 INJECTION INTRAMUSCULAR; INTRAVENOUS
Status: DISCONTINUED | OUTPATIENT
Start: 2018-03-02 | End: 2018-03-02 | Stop reason: HOSPADM

## 2018-03-02 RX ADMIN — MIDAZOLAM 1 MG: 1 INJECTION INTRAMUSCULAR; INTRAVENOUS at 19:39

## 2018-03-02 RX ADMIN — MIDAZOLAM 0.5 MG: 1 INJECTION INTRAMUSCULAR; INTRAVENOUS at 17:04

## 2018-03-02 RX ADMIN — ASPIRIN 81 MG 324 MG: 81 TABLET ORAL at 06:53

## 2018-03-02 RX ADMIN — FENTANYL CITRATE 25 MCG: 50 INJECTION, SOLUTION INTRAMUSCULAR; INTRAVENOUS at 19:09

## 2018-03-02 RX ADMIN — FENTANYL CITRATE 25 MCG: 50 INJECTION, SOLUTION INTRAMUSCULAR; INTRAVENOUS at 17:40

## 2018-03-02 RX ADMIN — MIDAZOLAM 0.5 MG: 1 INJECTION INTRAMUSCULAR; INTRAVENOUS at 18:46

## 2018-03-02 RX ADMIN — MIDAZOLAM 0.5 MG: 1 INJECTION INTRAMUSCULAR; INTRAVENOUS at 19:09

## 2018-03-02 RX ADMIN — FENTANYL CITRATE 25 MCG: 50 INJECTION, SOLUTION INTRAMUSCULAR; INTRAVENOUS at 18:33

## 2018-03-02 RX ADMIN — FENTANYL CITRATE 25 MCG: 50 INJECTION, SOLUTION INTRAMUSCULAR; INTRAVENOUS at 17:04

## 2018-03-02 RX ADMIN — FENTANYL CITRATE 25 MCG: 50 INJECTION, SOLUTION INTRAMUSCULAR; INTRAVENOUS at 18:45

## 2018-03-02 RX ADMIN — MIDAZOLAM 0.5 MG: 1 INJECTION INTRAMUSCULAR; INTRAVENOUS at 17:40

## 2018-03-02 RX ADMIN — FENTANYL CITRATE 25 MCG: 50 INJECTION, SOLUTION INTRAMUSCULAR; INTRAVENOUS at 17:14

## 2018-03-02 RX ADMIN — HEPARIN SODIUM 900 UNITS/HR: 10000 INJECTION, SOLUTION INTRAVENOUS at 11:40

## 2018-03-02 RX ADMIN — Medication 3000 UNITS: at 17:57

## 2018-03-02 RX ADMIN — Medication 4550 UNITS: at 11:40

## 2018-03-02 RX ADMIN — NITROGLYCERIN 0.4 MG: 0.4 TABLET SUBLINGUAL at 06:48

## 2018-03-02 RX ADMIN — LISINOPRIL 10 MG: 10 TABLET ORAL at 13:02

## 2018-03-02 RX ADMIN — MIDAZOLAM 0.5 MG: 1 INJECTION INTRAMUSCULAR; INTRAVENOUS at 17:57

## 2018-03-02 RX ADMIN — FENTANYL CITRATE 25 MCG: 50 INJECTION, SOLUTION INTRAMUSCULAR; INTRAVENOUS at 18:09

## 2018-03-02 RX ADMIN — MORPHINE SULFATE 4 MG: 4 INJECTION INTRAVENOUS at 08:12

## 2018-03-02 RX ADMIN — NITROGLYCERIN 0.4 MG: 0.4 TABLET SUBLINGUAL at 06:55

## 2018-03-02 RX ADMIN — SODIUM CHLORIDE: 9 INJECTION, SOLUTION INTRAVENOUS at 20:00

## 2018-03-02 RX ADMIN — MIDAZOLAM 0.5 MG: 1 INJECTION INTRAMUSCULAR; INTRAVENOUS at 17:14

## 2018-03-02 RX ADMIN — SODIUM CHLORIDE: 9 INJECTION, SOLUTION INTRAVENOUS at 13:43

## 2018-03-02 RX ADMIN — HEPARIN SODIUM 900 UNITS/HR: 10000 INJECTION, SOLUTION INTRAVENOUS at 13:03

## 2018-03-02 RX ADMIN — Medication 5000 UNITS: at 17:26

## 2018-03-02 RX ADMIN — Medication 3000 UNITS: at 18:25

## 2018-03-02 RX ADMIN — MIDAZOLAM 0.5 MG: 1 INJECTION INTRAMUSCULAR; INTRAVENOUS at 18:09

## 2018-03-02 RX ADMIN — NITROGLYCERIN 0.4 MG: 0.4 TABLET SUBLINGUAL at 07:05

## 2018-03-02 RX ADMIN — IOPAMIDOL 280 ML: 755 INJECTION, SOLUTION INTRAVASCULAR at 20:00

## 2018-03-02 RX ADMIN — TICAGRELOR 180 MG: 90 TABLET ORAL at 17:26

## 2018-03-02 RX ADMIN — FENTANYL CITRATE 25 MCG: 50 INJECTION, SOLUTION INTRAMUSCULAR; INTRAVENOUS at 17:57

## 2018-03-02 RX ADMIN — MIDAZOLAM 0.5 MG: 1 INJECTION INTRAMUSCULAR; INTRAVENOUS at 18:36

## 2018-03-02 ASSESSMENT — ACTIVITIES OF DAILY LIVING (ADL)
TOILETING: 0-->INDEPENDENT
COGNITION: 0 - NO COGNITION ISSUES REPORTED
COGNITION: 0 - NO COGNITION ISSUES REPORTED
BATHING: 0-->INDEPENDENT
RETIRED_EATING: 0-->INDEPENDENT
RETIRED_EATING: 0-->INDEPENDENT
DRESS: 0-->INDEPENDENT
AMBULATION: 0-->INDEPENDENT
RETIRED_COMMUNICATION: 0-->UNDERSTANDS/COMMUNICATES WITHOUT DIFFICULTY
SWALLOWING: 0-->SWALLOWS FOODS/LIQUIDS WITHOUT DIFFICULTY
FALL_HISTORY_WITHIN_LAST_SIX_MONTHS: NO
DRESS: 0-->INDEPENDENT
AMBULATION: 0-->INDEPENDENT
RETIRED_COMMUNICATION: 0-->UNDERSTANDS/COMMUNICATES WITHOUT DIFFICULTY
BATHING: 0-->INDEPENDENT
FALL_HISTORY_WITHIN_LAST_SIX_MONTHS: NO
SWALLOWING: 0-->SWALLOWS FOODS/LIQUIDS WITHOUT DIFFICULTY
TOILETING: 0-->INDEPENDENT
ADLS_ACUITY_SCORE: 9
TRANSFERRING: 0-->INDEPENDENT
TRANSFERRING: 0-->INDEPENDENT

## 2018-03-02 ASSESSMENT — PAIN DESCRIPTION - DESCRIPTORS: DESCRIPTORS: ACHING

## 2018-03-02 ASSESSMENT — ENCOUNTER SYMPTOMS
CHEST TIGHTNESS: 1
NAUSEA: 0
SHORTNESS OF BREATH: 1
DIAPHORESIS: 1
NUMBNESS: 1

## 2018-03-02 NOTE — ED PROVIDER NOTES
History     Chief Complaint:  Chest Pain    HPI   Gil Ordonez is a 62 year old male otherwise healthy who presents with chest pain. The patient reports being in bed this morning when he developed left sided chest tightness at a severity of 6/10 accompanied by mild shortness of breath, diaphoresis, and bilateral arm tingling. He denies racing heart or nausea and states that his symptoms are mildly improved at a 4/10 in severity. He additionally notes having indigestion last night with excessive burping.    CARDIAC RISK FACTORS:  Sex:    Male   Tobacco:   No   Hypertension:   No   Hyperlipidemia:  No   Diabetes:   No   Family History:  No   Family History of blood clots: No    Allergies:  No known drug allergies     Medications:    Omeprazole     Past Medical History:    GERD    Past Surgical History:    Back surgery  Orthopedic surgery    Family History:    History review. No contributing family history.     Social History:  Smoking status: no  Alcohol use: no   PCP: No primary care provider on file.   Patient presents with wife.   Marital Status:        Review of Systems   Constitutional: Positive for diaphoresis.   Respiratory: Positive for chest tightness and shortness of breath.    Gastrointestinal: Negative for nausea.   Neurological: Positive for numbness.   All other systems reviewed and are negative.    Physical Exam     Patient Vitals for the past 24 hrs:   BP Temp Temp src Heart Rate Resp SpO2 Height Weight   03/02/18 1100 119/74 - - 63 14 96 % - -   03/02/18 1000 114/65 - - 59 - 97 % - -   03/02/18 0945 110/69 - - 61 - 99 % - -   03/02/18 0930 (!) 123/95 - - 62 - 98 % - -   03/02/18 0915 116/83 - - 57 - 100 % - -   03/02/18 0900 118/77 - - 60 - 98 % - -   03/02/18 0845 120/80 - - 60 14 97 % - -   03/02/18 0815 132/88 - - 54 - 100 % - -   03/02/18 0800 141/89 - - 59 17 100 % - -   03/02/18 0745 (!) 143/97 - - 61 13 99 % - -   03/02/18 0730 134/88 - - 58 8 99 % - -   03/02/18 0715 127/79 - -  "61 14 96 % - -   03/02/18 0700 153/89 - - 58 10 99 % - -   03/02/18 0648 (!) 159/112 - - 78 21 - - -   03/02/18 0645 - - - - - 100 % - -   03/02/18 0641 (!) 143/92 - - - - - - -   03/02/18 0640 - 97.5  F (36.4  C) Oral - - - - -   03/02/18 0639 (!) 143/92 - Oral 55 16 99 % 1.727 m (5' 8\") 88 kg (194 lb)     Physical Exam  General: Stoic  HEENT:   The scalp and head appear normal    The pupils are equal, round, and reactive to light    Extraocular muscles are intact.    The nose is normal.    The oropharynx is normal.      Uvula is in the midline.    Neck:  Normal range of motion.    Lungs:  Clear.      No rales, no wheezing.      There is no tachypnea.  Non-labored.  Cardiac: Regular rate.      Normal S1 and S2.      No pathological murmur/rub    Abdomen: Soft. No distension, no localized tenderness or rebound.  MS:  Normal tone. Normal movement of all extremities.   Neurologic:     Normal mentation.  No cranial nerve deficits.  No focal motor or sensory                            changes.      Speech normal.  Psych:  Awake.     Alert.      Normal affect.      Appropriate interactions.  Skin:  No rash.      No lesions.    HEART Score  Background  Calculates the overall risk of adverse event in patient's presenting with chest pain.  Based on 5 criteria (each assigned 0-2 points) including suspiciousness of history, EKG, age, risk factors and troponin.    Data  62 year old male   does not have a problem list on file.   reports that he has never smoked. He does not have any smokeless tobacco history on file.  family history is not on file.  No results found for: TROPI  Criteria   0-2 points for each of 5 items (maximum of 10 points):  Score 2- History highly suspicious for coronary syndrome  Score 1- EKG with Non-specific repolarization disturbance  Score 1- Age 45 to 65 years old  Score 0- No risk factors for atherosclerotic disease  Score 0- Within normal limits for troponin levels  Interpretation  Risk of adverse " outcome  Heart Score: 4  Total Score 4-6- Adverse Outcome Risk 20.3% - Supports admission with standard rule-out management -serial troponins and stress testing    Emergency Department Course   ECG (06:39):  Rate 57 bpm. VT interval 170. QT/QTc 422/410.   Sinus bradycardia. ST & T wave abnormality, consider inferior ischemia. Abnormal ECG.  Interpreted by Javier Strickland MD.    ECG (11:49):  Rate 54 bpm. VT interval 168. QT/QTc 418/396.   Sinus bradycardia. Otherwise normal ECG.  Interpreted at 1154 by Javier Strickland MD.        Imaging:  Radiographic findings were communicated with the patient who voiced understanding of the findings.    X-ray Chest, 2 views:  No acute cardiopulmonary abnormality  As read by Radiology.     Laboratory:  0648 - Troponin POCT: 0.01   1005 - Troponin: 0.059 (H)     CBC: WNL (WBC 6.0, HGB 15.5, )   BMP: AWNL (Creatinine 0.99)    Interventions:  0648: Nitroglycerin 0.4 mg SL  0653: Aspirin 324 mg PO  0705: Nitroglycerin 0.4 mg SL  0812: Morphine 4 mg IV  Heparin infusion pending    Emergency Department Course:  Past medical records, nursing notes, and vitals reviewed.  0642: I performed an exam of the patient and obtained history, as documented above. GCS 15.  IV inserted and blood drawn.  The patient was taken for xray, see imaging results above.   Findings and plan explained to the Patient who consents to admission.     1113: Discussed the patient with Dr. Cabrera, who in turn spoke to Dr. Clark, who will admit the patient to a cardiac telemetry bed for further monitoring, evaluation, and treatment.      Impression & Plan      Medical Decision Making:  Gil Ordonez is a 62 year old male who presents with what sounds to be new onset angina and is rulng in with elevated enzymes. I am going to admit him to cardiac telemetry for continued treatment. I will heparinize him at this point. He is completely pain free and hemodynamically stable. He has received aspirin, nitro,  and is currently on oxygen. I will contact the hospitalist who can plan further evaluation which will likely include coronary angiography.    Diagnosis:    ICD-10-CM    1. Chest pain, unspecified type R07.9    2. Unstable angina (H) I20.0        Disposition:  Admitted to cardiac telemetry    Floridalma Weinstein  3/2/2018   [facility] EMERGENCY DEPARTMENT  I, Floridalma Weinstein, am serving as a scribe at 6:42 AM on 3/2/2018 to document services personally performed by Javier Strickland MD based on my observations and the provider's statements to me.        Javier Strikcland MD  03/05/18 [id number]

## 2018-03-02 NOTE — PLAN OF CARE
CR Orders received, chart reviewed, pt just admitted, will hold eval until tomorrow to provide time for medical management.

## 2018-03-02 NOTE — H&P
[facility] [place]    Hospitalist History and Physical    Name: Gil Ordonez    MRN: [id number]  YOB: 1955    Age: 62 year old  Date of Admission:  3/2/2018  Date of Service (when I saw the patient): 03/02/18    Assessment & Plan   Gil Ordonez is a 62 year old male with a PMH significant for GERD who presents for evaluation of chest pain. Workup in the ED reveals EKG - sinus bradycardia HR 56 w/o evidence of ST elevation or other ischemic findings. Troponin mildly elevated at 0.059. BMP and CBC w/ diff wnl, o/w unremarkable. CXR x 1 view negative for acute pathology. Patient was started on an IV heparin drip in the ED.    1. NSTEMI: Chest pain since 4:30 AM, resolved after several sublingual nitro and IV morphine in the Emergency Department. Denies prior chest pain issues. Denies family history of cardiac issues aside from his father having an MI when he was 84 years-old. No previous history of HTN, HLD, DM II, or tobacco use. His activity has been quite limited since 11/2017 due to low back pain from a herniated disc, ended up having surgery for it 1/18/2018 and was just about to have his follow up appointment with his spine surgeon to lift his activity restrictions. Previous to 11/2017 he had denied any exertional chest pain or shortness of breath and had been quite active with [employer]90[employer] at-home workouts and his stationary bike.   -Continue heparin drip  -Start Lisinopril, carvedilol, simvastatin  -NPO in preparation for possible cath today  -Trend troponins  -Monitor on telemetry  -TTE  -Cardiology consult  -Prn O2, nitro and morphine    2. GERD: Can likely resume PTA omeprazole when able to take po medications.    # Pain Assessment:   Current Pain Score 3/2/2018 3/2/2018 3/2/2018   Patient currently in pain? - yes -   Pain score (0-10) 0 1 5   Pain location - Chest -   Pain descriptors - Aching -   Gil hernandez pain level was assessed and he denied pain on exam. He has prn nitro and IV  morphine ordered for pain.    DVT Prophylaxis: On heparin gtt  Code Status: Full Code  Disposition: Discharge pending plan from cardiology standpoint could be in 1-3 days depending on if coronary angiography pursued and if so, when it will be done    Primary Care Physician   Fabian Collazo    Chief Complaint   Chest pain    History is obtained from the patient and sign out obtained from ER physician Dr. Strickland.    History of Present Illness  chest pain  Gil Ordonez is a 62 year old male with a PMH significant for GERD who presents for evaluation of chest pain.  The patient reports that around 430 this morning he woke up and felt extremely warm with central and left chest tightness that remained constant for 2 hours.  He denied associated shortness of breath, nausea, vomiting, sweating.  He tried taking Tums for the pain without relief.  The patient reports that his wife felt that he looked very pale.  He also reported some tingling sensation upper arms and chest.  She had never experienced symptoms like this before.  He denied recent illness, specifically no fever, chills, nasal congestion, sinus pressure, sore throats, cough, abdominal pain, diarrhea, or any urinary issues.  His pain seemed to crescendo between 5 and 6 AM, so his wife encouraged him to seek evaluation in the emergency department    Workup in the ED reveals EKG - sinus bradycardia HR 56 w/o evidence of ST elevation or other ischemic findings. Troponin mildly elevated at 0.059. BMP and CBC w/ diff wnl, o/w unremarkable. CXR x 1 view negative for acute pathology. Patient was started on an IV heparin drip in the ED. His pain resolved after taking 324 mg po aspirin, 0.4 mg sublingual nitro, and 4 mg IV morphine. Dr. Strickland requested admission out of concern for ACS.     Past Medical History    History reviewed. No pertinent past medical history.      Past Surgical History   Past Surgical History:   Procedure Laterality Date     BACK SURGERY        ORTHOPEDIC SURGERY         Prior to Admission Medications   Prior to Admission Medications   Prescriptions Last Dose Informant Patient Reported? Taking?   OMEPRAZOLE PO   Yes Yes      Facility-Administered Medications: None     Allergies   No Known Allergies    Social History   Social History   Substance Use Topics     Smoking status: Never Smoker     Smokeless tobacco: Not on file     Alcohol use No     Social History     Social History Narrative     No narrative on file       Family History   I have reviewed this patient's family history and updated it with pertinent information if needed.     Review of Systems   A Comprehensive greater than 10 system review of systems was carried out.  Pertinent positives and negatives are noted above.  Otherwise negative for contributory information.    Physical Exam   Temp: 97.5  F (36.4  C) Temp src: Oral BP: 119/74   Heart Rate: 63 Resp: 14 SpO2: 96 % O2 Device: None (Room air)    Vital Signs with Ranges  Temp:  [97.5  F (36.4  C)] 97.5  F (36.4  C)  Heart Rate:  [54-78] 63  Resp:  [8-21] 14  BP: (110-159)/() 119/74  SpO2:  [96 %-100 %] 96 %  194 lbs 0 oz    GEN:  Alert, oriented x 3, appears comfortable, no overt distress  HEENT:  Normocephalic/atraumatic, no scleral icterus, no nasal discharge, mouth moist.  CV:  Regular rate and rhythm, no murmur or JVD.  S1 + S2 noted, no S3 or S4.  LUNGS:  Clear to auscultation bilaterally without rales/rhonchi/wheezing/retractions.  Symmetric chest rise on inhalation noted.  ABD:  Active bowel sounds, soft, non-tender/non-distended.  No rebound/guarding/rigidity.  EXT:  No edema.  No cyanosis.  No acute joint synovitis noted.  SKIN:  Dry to touch, no exanthems noted in the visualized areas.  NEURO:  Symmetric muscle strength, sensation to touch grossly intact.  Coordination symmetric on general exam.  No new focal deficits appreciated.    Data   Data reviewed today:  I personally reviewed the EKG tracing showing sinus bradycardia  in the 50s and the chest x-ray image(s) showing no acute pathology.    Results for orders placed or performed during the hospital encounter of 03/02/18 (from the past 48 hour(s))   CBC with platelets differential   Result Value Ref Range    WBC 6.0 4.0 - 11.0 10e9/L    RBC Count 5.36 4.4 - 5.9 10e12/L    Hemoglobin 15.5 13.3 - 17.7 g/dL    Hematocrit 47.4 40.0 - 53.0 %    MCV 88 78 - 100 fl    MCH 28.9 26.5 - 33.0 pg    MCHC 32.7 31.5 - 36.5 g/dL    RDW 13.1 10.0 - 15.0 %    Platelet Count 314 150 - 450 10e9/L    Diff Method Automated Method     % Neutrophils 55.5 %    % Lymphocytes 30.1 %    % Monocytes 9.7 %    % Eosinophils 3.8 %    % Basophils 0.7 %    % Immature Granulocytes 0.2 %    Nucleated RBCs 0 0 /100    Absolute Neutrophil 3.3 1.6 - 8.3 10e9/L    Absolute Lymphocytes 1.8 0.8 - 5.3 10e9/L    Absolute Monocytes 0.6 0.0 - 1.3 10e9/L    Absolute Eosinophils 0.2 0.0 - 0.7 10e9/L    Absolute Basophils 0.0 0.0 - 0.2 10e9/L    Abs Immature Granulocytes 0.0 0 - 0.4 10e9/L    Absolute Nucleated RBC 0.0    Basic metabolic panel   Result Value Ref Range    Sodium 141 133 - 144 mmol/L    Potassium 4.0 3.4 - 5.3 mmol/L    Chloride 107 94 - 109 mmol/L    Carbon Dioxide 31 20 - 32 mmol/L    Anion Gap 3 3 - 14 mmol/L    Glucose 92 70 - 99 mg/dL    Urea Nitrogen 20 7 - 30 mg/dL    Creatinine 0.99 0.66 - 1.25 mg/dL    GFR Estimate 77 >60 mL/min/1.7m2    GFR Estimate If Black >90 >60 mL/min/1.7m2    Calcium 9.3 8.5 - 10.1 mg/dL   EKG 12 lead   Result Value Ref Range    Interpretation ECG Click View Image link to view waveform and result    Troponin POCT   Result Value Ref Range    Troponin I 0.01 0.00 - 0.10 ug/L   XR Chest Port 1 View    Narrative    XR CHEST PORT 1 VW 3/2/2018 7:26 AM    HISTORY: Chest pain.    COMPARISON: None.    FINDINGS: No airspace consolidation, pleural effusion or pneumothorax.  Normal heart size.      Impression    IMPRESSION: No acute cardiopulmonary abnormality.    LAURO CANTRELL MD   Troponin I    Result Value Ref Range    Troponin I ES 0.059 (H) 0.000 - 0.045 ug/L       Preethi MCCONNELL I discussed the patient with Dr. Clark who is in agreement with the above plan.

## 2018-03-02 NOTE — IP AVS SNAPSHOT
MRN:[id number]                      After Visit Summary   3/2/2018    Gil Ordonez    MRN: [id number]           Thank you!     Thank you for choosing [facility] for your care. Our goal is always to provide you with excellent care. Hearing back from our patients is one way we can continue to improve our services. Please take a few minutes to complete the written survey that you may receive in the mail after you visit with us. Thank you!        Patient Information     Date Of Birth          1955        About your hospital stay     You were admitted on:  March 2, 2018 You last received care in the:  [facility] Coronary Care Unit    You were discharged on:  March 3, 2018        Reason for your hospital stay       You were admitted for heart attack for which you had stents placed.                  Who to Call     For medical emergencies, please call 911.  For non-urgent questions about your medical care, please call your primary care provider or clinic, [phone]          Attending Provider     Provider Specialty    Wallace Aceves MD Internal Medicine    Misti, Alejandro Mayes MD Cardiology       Primary Care Provider Office Phone # Fax #    Fabian Collazo [phone]      After Care Instructions     Activity       Your activity upon discharge: activity as tolerated            Diet       Follow this diet upon discharge:       Combination Diet Low Saturated Fat Na <2400mg Diet                  Follow-up Appointments     Follow-up and recommended labs and tests        You will be contacted by Cardiology clinic to schedule outpatient follow up.  Follow up with primary care provider in 1-2 weeks.  Follow up with cardiac rehab as outpatient.                  Additional Services     CARDIAC REHAB REFERRAL       Patient may choose their preference of the site for Cardiac Rehab.            Follow-Up with Cardiac Advanced Practice Provider                 Further  instructions from your care team       Cardiac Angiogram Discharge Instructions - Radial    After you go home:      Have an adult stay with you until tomorrow.    Drink extra fluids for 2 days.    You may resume your normal diet.    No smoking       For 24 hours - due to the sedation you received:    Relax and take it easy.    Do NOT make any important or legal decisions.    Do NOT drive or operate machines at home or at work.    Do NOT drink alcohol.    Care of Wrist Puncture Site:      For the first 24 hrs - check the puncture site every 1-2 hours while awake.    It is normal to have soreness at the puncture site and mild tingling in your hand for up to 3 days.    Remove the bandaid after 24 hours. If there is minor oozing, apply another bandaid and remove it after 12 hours.    You may shower tomorrow.  Do NOT take a bath, or use a hot tub or pool for at least 3 days. Do NOT scrub the site. Do not use lotion or powder near the puncture site.           Activity:        For 2 days:     do not use your hand or arm to support your weight (such as rising from a chair)     do not bend your wrist (such as lifting a garage door).    do not lift more than 5 pounds or exercise your arm (such as tennis, golf or bowling).    Do NOT do any heavy activity such as exercise, lifting, or straining.     Bleeding:      If you start bleeding from the site in your wrist, sit down and press firmly on/above the site for 10 minutes.     Once bleeding stops, keep arm still for 2 hours.     Call [facility] as soon as you can.       Call 911 right away if you have heavy bleeding or bleeding that does not stop.      Medicines:      If you are taking antiplatelet medications such as Plavix, Brilinta or Effient, do not stop taking it until you talk to your cardiologist.        If you are on Metformin (Glucophage), do not restart it until you have blood tests (within 2 to 3 days after discharge).  After you have your blood drawn, you may  "restart the Metformin.     Take your medications, including blood thinners, unless your provider tells you not to.  If you take Coumadin (Warfarin), have your INR checked by your provider in  3-5 days. Call your clinic to schedule this.    If you have stopped any medicines, check with your provider about when to restart them.    Follow Up Appointments:      Follow up with [facility] Nurse Practitioner at [facility] of patient preference in 7-10 days.    Call the clinic if:      You have a large or growing hard lump around the site.    The site is red, swollen, hot or tender.    Blood or fluid is draining from the site.    You have chills or a fever greater than 101 F (38 C).    Your arm turns feels numb, cool or changes color.    You have hives, a rash or unusual itching.    Any questions or concerns.          [facility] [place]:    [phone] [facility] (7 days a week)            Pending Results     Date and Time Order Name Status Description    3/2/2018 2013 EKG 12-lead, tracing only  Preliminary             Statement of Approval     Ordered          03/03/18 0913  I have reviewed and agree with all the recommendations and orders detailed in this document.  EFFECTIVE NOW     Comments:  May discharge once cleared by cardiac rehab.   Approved and electronically signed by:  Wallace Aceves MD             Admission Information     Date & Time Provider Department Dept. Phone    3/2/2018 Alejandro Chappell MD [facility] Coronary Care Unit [phone]      Your Vitals Were     Blood Pressure Respirations Weight Pulse Oximetry BMI (Body Mass Index)       120/67 19 88.8 kg (195 lb 11.2 oz) 98% 29.76 kg/m2       MyChart Information     [facility] lets you send messages to your doctor, view your test results, renew your prescriptions, schedule appointments and more. To sign up, go to [email or web address] . Click on \"Log in\" on the left side of the screen, which will take you to " "the Welcome page. Then click on \"Sign up Now\" on the right side of the page.     You will be asked to enter the access code listed below, as well as some personal information. Please follow the directions to create your username and password.     Your access code is: [email or web address]  Expires: 2018  3:53 PM     Your access code will  in 90 days. If you need help or a new code, please call your [place] clinic or [phone].        Care EveryWhere ID     This is your Care EveryWhere ID. This could be used by other organizations to access your [facility] medical records  [id number]        Equal Access to Services     [facility] Hospital[facility] : Amie Fraga, mason laughlin, vladislav zacarias, ava paredes . So [facility] [phone].    ATENCIÓN: Si habla español, tiene a peck disposición servicios gratuitos de asistencia lingüística. Llame al [phone].    We comply with applicable federal civil rights laws and [place] laws. We do not discriminate on the basis of race, color, national origin, age, disability, sex, sexual orientation, or gender identity.               Review of your medicines      START taking        Dose / Directions    aspirin 81 MG EC tablet        Dose:  81 mg   Start taking on:  3/4/2018   Take 1 tablet (81 mg) by mouth daily   Quantity:  90 tablet   Refills:  2       atorvastatin 80 MG tablet   Commonly known as:  LIPITOR        Dose:  80 mg   Start taking on:  3/4/2018   Take 1 tablet (80 mg) by mouth daily   Quantity:  30 tablet   Refills:  2       metoprolol succinate 25 MG 24 hr tablet   Commonly known as:  TOPROL-XL        Dose:  25 mg   Start taking on:  3/4/2018   Take 1 tablet (25 mg) by mouth daily   Quantity:  30 tablet   Refills:  2       nitroGLYcerin 0.4 MG sublingual tablet   Commonly known as:  NITROSTAT        For chest pain place 1 tablet under the tongue every 5 minutes for 3 doses. If symptoms persist 5 minutes after 1st dose call " 911.   Quantity:  25 tablet   Refills:  0       ticagrelor 90 MG tablet   Commonly known as:  BRILINTA        Dose:  90 mg   Take 1 tablet (90 mg) by mouth every 12 hours   Quantity:  60 tablet   Refills:  11         CONTINUE these medicines which have NOT CHANGED        Dose / Directions    calcium carbonate 500 MG chewable tablet   Commonly known as:  TUMS        Dose:  2 chew tab   Take 2 chew tab by mouth as needed for heartburn   Refills:  0       fish oil-omega-3 fatty acids 1000 MG capsule        Dose:  2 g   Take 2 g by mouth daily   Refills:  0       multivitamin, therapeutic with minerals Tabs tablet        Dose:  1 tablet   Take 1 tablet by mouth daily   Refills:  0       omeprazole 20 MG tablet        Dose:  20 mg   Take 20 mg by mouth daily   Refills:  0            Where to get your medicines      These medications were sent to [facility] - [address]  [address], [place] [address]     Phone:  [phone]     aspirin 81 MG EC tablet    atorvastatin 80 MG tablet    metoprolol succinate 25 MG 24 hr tablet    nitroGLYcerin 0.4 MG sublingual tablet    ticagrelor 90 MG tablet                Protect others around you: Learn how to safely use, store and throw away your medicines at www.disposemymeds.org.             Medication List: This is a list of all your medications and when to take them. Check marks below indicate your daily home schedule. Keep this list as a reference.      Medications           Morning Afternoon Evening Bedtime As Needed    aspirin 81 MG EC tablet   Take 1 tablet (81 mg) by mouth daily   Start taking on:  3/4/2018   Last time this was given:  81 mg on 3/3/2018  8:53 AM                                   atorvastatin 80 MG tablet   Commonly known as:  LIPITOR   Take 1 tablet (80 mg) by mouth daily   Start taking on:  3/4/2018   Last time this was given:  80 mg on 3/3/2018  8:53 AM                                   calcium carbonate 500 MG  chewable tablet   Commonly known as:  TUMS   Take 2 chew tab by mouth as needed for heartburn                                   fish oil-omega-3 fatty acids 1000 MG capsule   Take 2 g by mouth daily                                   metoprolol succinate 25 MG 24 hr tablet   Commonly known as:  TOPROL-XL   Take 1 tablet (25 mg) by mouth daily   Start taking on:  3/4/2018   Last time this was given:  25 mg on 3/3/2018  8:53 AM                                   multivitamin, therapeutic with minerals Tabs tablet   Take 1 tablet by mouth daily                                   nitroGLYcerin 0.4 MG sublingual tablet   Commonly known as:  NITROSTAT   For chest pain place 1 tablet under the tongue every 5 minutes for 3 doses. If symptoms persist 5 minutes after 1st dose call 911.                                   omeprazole 20 MG tablet   Take 20 mg by mouth daily                                   ticagrelor 90 MG tablet   Commonly known as:  BRILINTA   Take 1 tablet (90 mg) by mouth every 12 hours   Last time this was given:  90 mg on 3/3/2018  6:28 AM                                                More Information        Discharge Instructions for Peripheral Angioplasty  You had a procedure known as peripheral angioplasty. Peripheral arteries deliver blood to your legs and feet. Over time, your artery walls may thicken and build up with a fatty substance (plaque). As plaque builds up in an artery, blood flow can be reduced or even blocked. This causes peripheral artery disease and problems in your legs and feet. Peripheral angioplasty is a procedure that helps open blockages in peripheral arteries.  Home care    Don t drive for 2 to 3 days after the procedure or if you are taking opioid pain medicines.    Rest for 2 to 3 days after the procedure. You will likely be able to go back to your normal activity within a few days.    Don t lift anything heavier than 10 pounds for 5 to 7 days.    Take your temperature and check  your incision site for signs of infection (redness, swelling, drainage, or warmth) every day for a week.    Keep a dressing on the incision site for at least 24 hours, or as directed by your doctor.    Take your medicines exactly as directed. Don t skip doses.    You can shower the day after the procedure.    If you have stitches (sutures), avoid swimming or taking a bath for 7 days after the procedure or until the stitches are removed.    Unless directed otherwise, drink 6 to 8 glasses of water a day. This can prevent dehydration. It can also flush the dye used during your procedure out of your body. (Talk to your doctor first if you are on dialysis or have heart failure.)    Eat a healthy diet that is low in fat, salt, and cholesterol. Ask your doctor for menus and other diet information.    Begin an exercise program. Ask your doctor how to get started. You can benefit from simple activities such as walking or gardening.    If you are a smoker, try to break the smoking habit. Join a stop-smoking program to increase your chances of success.  Follow-up    If you have stitches or staples, see your doctor to have them removed 7 to 10 days after your procedure.  When to call your healthcare provider  Call your provider right away if you have any of the following:    Fever of 100.4 F (38 C)    Signs of infection at the incision site (redness, swelling, or warmth)    Drainage from your incision    Changes in color, temperature, feeling, or movement in either foot or leg    Constant or increasing pain or numbness in your leg    Leg swelling that does not improve overnight    Bleeding, bruising, or a large swelling where the catheter was inserted    Blood in your urine    Black or tarry stools    Dizziness or shortness of breath   Date Last Reviewed: 6/1/2016 2000-2017 [employer]. [address] [place] [address], [place] [address]. All rights reserved. This information is not intended as a substitute for  professional medical care. Always follow your healthcare professional's instructions.

## 2018-03-02 NOTE — IP AVS SNAPSHOT
` `     [place] [address] MEDICAL SURGICAL: [phone]            Medication Administration Report for Gil Ordonez as of 03/02/18 3519   Legend:    Given Hold Not Given Due Canceled Entry Other Actions    Time Time (Time) Time  Time-Action       Inactive    Active    Linked        Medications 02/24/18 02/25/18 02/26/18 02/27/18 02/28/18 03/01/18 03/02/18    acetaminophen (TYLENOL) Suppository 650 mg  Dose: 650 mg  Freq: EVERY 4 HOURS PRN Route: RE  PRN Reason: mild pain  Start: 03/02/18 1236   Admin Instructions: Maximum acetaminophen dose from all sources = 75 mg/kg/day not to exceed 4 grams/day.               acetaminophen (TYLENOL) tablet 650 mg  Dose: 650 mg  Freq: EVERY 4 HOURS PRN Route: PO  PRN Reason: mild pain  Start: 03/02/18 1236   Admin Instructions: Maximum acetaminophen dose from all sources = 75 mg/kg/day not to exceed 4 grams/day.               alum & mag hydroxide-simethicone (MYLANTA ES/MAALOX  ES) suspension 30 mL  Dose: 30 mL  Freq: EVERY 4 HOURS PRN Route: PO  PRN Reason: indigestion  Start: 03/02/18 1236   Admin Instructions: Shake well.               aspirin chewable tablet 324 mg  Dose: 324 mg  Freq: ONCE Route: PO  Start: 03/02/18 1237   Admin Instructions: if not given in ED or by EMS                      carvedilol (COREG) tablet 6.25 mg  Dose: 6.25 mg  Freq: 2 TIMES DAILY Route: PO  Start: 03/02/18 1300   Admin Instructions: Hold IF Heart Rate less than 55 or IF systolic blood pressure less than 85 mmHg           1259-Hold [C]       [ ] 2000             Start: 03/02/18 1426   End: 03/02/18 1507   Admin Instructions: Corrina Hanikns : cabinet override  For ordered doses up to 100 mcg give IV Push undiluted over a minimum of 3-5 minutes.           1507-Med Discontinued       heparin (porcine) 1000 UNIT/ML injection  Start: 03/02/18 1427   Admin Instructions: Corrina Hankins : cabinet override               heparin infusion 25,000 units in 0.45% NaCl 250 mL  Rate: 9 mL/hr  "Dose: 900 Units/hr  Freq: CONTINUOUS Route: IV  Last Dose: 900 Units/hr (03/02/18 1303)  Start: 03/02/18 1315   Admin Instructions: Ordered dose: 12 units/kg/hr x 76.2 kg (adjusted body weight) = 900 units/hr (rounded to the nearest 50 units/hr from 914 units/hr)           1303 (900 Units/hr)-New Bag           HOLD: nitroGLYcerin IF  Freq: HOLD Route: XX  Start: 03/02/18 1236   End: 03/04/18 0035   Admin Instructions: IF patient has received sildenafil (VIAGRA/REVATIO) within the last 8 hours, avanafil (STENDRA) within the last 8 hours, vardenafil (LEVITRA/STAXYN) within the last 18 hours, or tadalafil (CIALIS/ADCIRCA) within the last 36 hours.  Inform provider if patient has taken one of those medications.    Order specific questions:  Medication(s) to hold: Nitroglycerin  Parameter for hold (doses,days,conditions) : Other (see admin instructions)  Surgery or Procedure Date 3/2/2018                lidocaine (LMX4) kit  Freq: EVERY 1 HOUR PRN Route: Top  PRN Reason: pain  PRN Comment: with VAD insertion or accessing implanted port.  Start: 03/02/18 1338   Admin Instructions: Do NOT give if patient has a history of allergy to any local anesthetic or any \"yosvany\" product.   Apply 30 minutes prior to VAD insertion or port access. MAX Dose: 2.5 g (  of 5 g tube)               lidocaine (LMX4) kit  Freq: EVERY 1 HOUR PRN Route: Top  PRN Reason: pain  PRN Comment: with VAD insertion or accessing implanted port.  Start: 03/02/18 1236   Admin Instructions: Do NOT give if patient has a history of allergy to any local anesthetic or any \"yosvany\" product.   Apply 30 minutes prior to VAD insertion or port access.  MAX Dose:  2.5 g (  of 5 g tube)               lidocaine 1 % 1 mL  Dose: 1 mL  Freq: EVERY 1 HOUR PRN Route: OTHER  PRN Comment: mild pain with VAD insertion or accessing implanted port  Start: 03/02/18 1338   Admin Instructions: Do NOT give if patient has a history of allergy to any local anesthetic or any \"yosvany\" " "product. MAX dose 1 mL subcutaneous OR intradermal in divided doses.               lidocaine 1 % 1 mL  Dose: 1 mL  Freq: EVERY 1 HOUR PRN Route: OTHER  PRN Comment: mild pain with VAD insertion or accessing implanted port  Start: 03/02/18 1236   Admin Instructions: Do NOT give if patient has a history of allergy to any local anesthetic or any \"yosvany\" product. MAX dose 1 mL subcutaneous OR intradermal in divided doses.               lisinopril (PRINIVIL/ZESTRIL) tablet 10 mg  Dose: 10 mg  Freq: DAILY Route: PO  Start: 03/02/18 1237   Admin Instructions: Hold for systolic blood pressure less than 85 mmHg           1302 (10 mg)-Given           LORazepam (ATIVAN) injection 0.5 mg  Dose: 0.5 mg  Freq: EVERY 2 HOURS PRN Route: IV  PRN Reason: anxiety  PRN Comment: prior to cath lab procedure  Start: 03/02/18 1338   Admin Instructions: Give on nursing unit.      For IV PUSH: Dilute with equal volume of NS. For ordered doses up to 4 mg give IV Push. Administer each 2mg over 1-5 minutes.              Or  LORazepam (ATIVAN) tablet 0.5 mg  Dose: 0.5 mg  Freq: EVERY 2 HOURS PRN Route: PO  PRN Reason: anxiety  PRN Comment: prior to cath lab procedure  Start: 03/02/18 1338   Admin Instructions: Give on nursing unit                 medication instruction  Freq: CONTINUOUS PRN Route: XX  Start: 03/02/18 1236   Admin Instructions: No IV fluids                 Start: 03/02/18 1426   End: 03/02/18 1507   Admin Instructions: Corrina Hankins : cabinet override  For ordered doses up to 2.5 mg give IV Push slowly titrated over a minimum of 2 minutes. Dilute each 1mg in 4mL of NS.           1507-Med Discontinued       naloxone (NARCAN) injection 0.1-0.4 mg  Dose: 0.1-0.4 mg  Freq: EVERY 2 MIN PRN Route: IV  PRN Reason: opioid reversal  Start: 03/02/18 1236   Admin Instructions: For respiratory rate LESS than or EQUAL to 8.  Partial reversal dose:  0.1 mg titrated q 2 minutes for Analgesia Side Effects Monitoring Sedation Level of 3 " (frequently drowsy, arousable, drifts to sleep during conversation).Full reversal dose:  0.4 mg bolus for Analgesia Side Effects Monitoring Sedation Level of 4 (somnolent, minimal or no response to stimulation).  For ordered doses up to 2mg give IVP. Give each 0.4mg over 15 seconds in emergency situations. For non-emergent situations further dilute in 9mL of NS to facilitate titration of response.               nitroGLYcerin (NITROSTAT) sublingual tablet 0.4 mg  Dose: 0.4 mg  Freq: EVERY 5 MIN PRN Route: SL  PRN Reason: chest pain  Start: 03/02/18 1236   Admin Instructions: Maximum 3 doses in 15 minutes.  Notify provider if no relief after 3 doses.  Do NOT give nitroglycerin SL IF patient has received sildenafil (VIAGRA/REVATIO) within the last 8 hours, avanafil (STENDRA) within the last 8 hours, vardenafil (LEVITRA/STAXYN) within the last 18 hours, or tadalafil (CIALIS/ADCIRCA) within the last 36 hours.  Inform provider if patient has taken one of these medications.  If patient is still having acute angina requiring treatment, an alternative treatment option may be used such as: IV beta-blocker (2.5 mg - 5 mg) metoprolol (LOPRESSOR) if ordered by a provider.               nitroGLYcerin 100 mcg/mL injection  Start: 03/02/18 6567   Admin Instructions: Corrina Hankins : aj override               ondansetron (ZOFRAN-ODT) ODT tab 4 mg  Dose: 4 mg  Freq: EVERY 6 HOURS PRN Route: PO  PRN Reasons: nausea,vomiting  Start: 03/02/18 1236   Admin Instructions: This is Step 1 of nausea and vomiting management.  If nausea not resolved in 15 minutes, go to Step 2 prochlorperazine (COMPAZINE). Do not push through foil backing. Peel back foil and gently remove. Place on tongue immediately. Administration with liquid unnecessary  With dry hands, peel back foil backing and gently remove tablet; do not push oral disintegrating tablet through foil backing; administer immediately on tongue and oral disintegrating tablet  dissolves in seconds; then swallow with saliva; liquid not required.              Or  ondansetron (ZOFRAN) injection 4 mg  Dose: 4 mg  Freq: EVERY 6 HOURS PRN Route: IV  PRN Reasons: nausea,vomiting  Start: 03/02/18 1236   Admin Instructions: This is Step 1 of nausea and vomiting management.  If nausea not resolved in 15 minutes, go to Step 2 prochlorperazine (COMPAZINE).  Irritant. For ordered doses up to 4 mg, give IV Push undiluted over 2-5 minutes.               Patient is already receiving anticoagulation with heparin, enoxaparin (LOVENOX), warfarin (COUMADIN)  or other anticoagulant medication  Freq: CONTINUOUS PRN Route: XX  Start: 03/02/18 1236              polyethylene glycol (MIRALAX/GLYCOLAX) Packet 17 g  Dose: 17 g  Freq: DAILY PRN Route: PO  PRN Reason: constipation  Start: 03/02/18 1236   Admin Instructions: Give in 8oz of  water, juice, or soda. Hold for loose stools.  This is the second step of a three step constipation treatment.  1 Packet = 17 grams. Mixed prescribed dose in 8 ounces of water. Follow with 8 oz. of water.               potassium chloride SA (K-DUR/KLOR-CON M) CR tablet 20 mEq  Dose: 20 mEq  Freq: ONCE PRN Route: PO  PRN Reason: potassium supplementation  Start: 03/02/18 1338   Admin Instructions: If K+ is less than 4.0 MMOL/L  give potassium chloride (K-DUR)  If K+ is less than 3.5 MMOL/L give potassium chloride (K-DUR) and NOTIFY provider for further orders  Prior to cardiac cath lab procedure.  DO NOT CRUSH               prochlorperazine (COMPAZINE) injection 10 mg  Dose: 10 mg  Freq: EVERY 6 HOURS PRN Route: IV  PRN Reasons: nausea,vomiting  Start: 03/02/18 1236   Admin Instructions: This is Step 2 of nausea and vomiting management. Give if nausea not resolved 15 minutes after giving ondansetron (ZOFRAN).  If nausea not resolved in 15 minutes, go to Step 3 metoclopramide (REGLAN), if ordered.  For ordered doses up to 10 mg, give IV Push undiluted. Each 5mg over 1 minute.               Or  prochlorperazine (COMPAZINE) tablet 10 mg  Dose: 10 mg  Freq: EVERY 6 HOURS PRN Route: PO  PRN Reason: vomiting  Start: 03/02/18 1236   Admin Instructions: This is Step 2 of nausea and vomiting management. Give if nausea not resolved 15 minutes after giving ondansetron (ZOFRAN).  If nausea not resolved in 15 minutes, go to Step 3 metoclopramide (REGLAN), if ordered.              Or  prochlorperazine (COMPAZINE) Suppository 25 mg  Dose: 25 mg  Freq: EVERY 12 HOURS PRN Route: RE  PRN Reasons: nausea,vomiting  Start: 03/02/18 1236   Admin Instructions: This is Step 2 of nausea and vomiting management. Give if nausea not resolved 15 minutes after giving ondansetron (ZOFRAN).  If nausea not resolved in 15 minutes, go to Step 3 metoclopramide (REGLAN), if ordered.               senna-docusate (SENOKOT-S;PERICOLACE) 8.6-50 MG per tablet 1 tablet  Dose: 1 tablet  Freq: 2 TIMES DAILY PRN Route: PO  PRN Reason: constipation  Start: 03/02/18 1236   Admin Instructions: If no bowel movement in 24 hours, increase to 2 tablets PO.  Hold for loose stools.  This is the first step of a three step constipation treatment.              Or  senna-docusate (SENOKOT-S;PERICOLACE) 8.6-50 MG per tablet 2 tablet  Dose: 2 tablet  Freq: 2 TIMES DAILY PRN Route: PO  PRN Reason: constipation  Start: 03/02/18 1236   Admin Instructions: Hold for loose stools.  This is the first step of a three step constipation treatment.               sodium chloride (PF) 0.9% PF flush 3 mL  Dose: 3 mL  Freq: EVERY 8 HOURS Route: IK  Start: 03/02/18 1345   Admin Instructions: And Q1H PRN, to lock peripheral IV dormant line.                  [ ] 2145           sodium chloride (PF) 0.9% PF flush 3 mL  Dose: 3 mL  Freq: EVERY 1 HOUR PRN Route: IK  PRN Reason: line flush  Start: 03/02/18 1338   Admin Instructions: for peripheral IV flush post IV meds               sodium chloride (PF) 0.9% PF flush 3 mL  Dose: 3 mL  Freq: EVERY 8 HOURS Route: IK  Start:  03/02/18 1237   Admin Instructions: And Q1H PRN, to lock peripheral IV dormant line.           (1258)-Not Given       [ ] 2037           sodium chloride (PF) 0.9% PF flush 3 mL  Dose: 3 mL  Freq: EVERY 1 HOUR PRN Route: IK  PRN Reason: line flush  PRN Comment: for peripheral IV flush post IV meds  Start: 03/02/18 1236              sodium chloride 0.9% infusion  Rate: 150 mL/hr   Freq: CONTINUOUS Route: IV  Start: 03/02/18 1345   Admin Instructions: 150 mL/hr IV for 2 hours prior to cardiac cath lab procedure, then decrease to 75 mL/hr to run throughout the procedure. Start at 0700 for inpatients.    IF PATIENT IS RECEIVING RENAL DIALYSIS, RN to reduce rate of  0.9 % sodium chloride IV solution to 10 mL /hour (TKO) IV infusion to prevent fluid overload.           1343 ( )-New Bag             Start: 03/02/18 1426   End: 03/02/18 1541   Admin Instructions: Corrina Hankins : cabinet override  Protect from light.           1541-Med Discontinued      Future Medications  Medications 02/24/18 02/25/18 02/26/18 02/27/18 02/28/18 03/01/18 03/02/18       aspirin EC EC tablet 325 mg  Dose: 325 mg  Freq: DAILY Route: PO  Start: 03/03/18 0900   Admin Instructions: Once the day prior to the procedure and once the morning of the cath lab procedure.     Exceptions:   IF Patient is allergic to aspirin OR  IF Patient already received Aspirin 325 mg today               simvastatin (ZOCOR) tablet 20 mg  Dose: 20 mg  Freq: EVERY EVENING Route: PO  Start: 03/02/18 2000          [ ] 2000          Completed Medications  Medications 02/24/18 02/25/18 02/26/18 02/27/18 02/28/18 03/01/18 03/02/18         Dose: 324 mg  Freq: ONCE Route: PO  Start: 03/02/18 0651   End: 03/02/18 0653   Admin Instructions: Hold if patient allergy to aspirin, or if aspirin has been given during this episode.           0653 (324 mg)-Given             Dose: 4,550 Units  Freq: ONCE Route: IV  Start: 03/02/18 1118   End: 03/02/18 1140   Admin Instructions: Nurse to  administer dose from existing infusion. If no infusion bag or syringe for this order is available, contact pharmacist to re-enter medication order.           1140 (4,550 Units)-Given             Start: 03/02/18 0646   End: 03/02/18 0648   Admin Instructions: Eileen Davies : aj override           0648 (0.4 mg)-Given          Discontinued Medications  Medications 02/24/18 02/25/18 02/26/18 02/27/18 02/28/18 03/01/18 03/02/18         Dose: 325 mg  Freq: DAILY Route: PO  Start: 03/03/18 0800   End: 03/02/18 1342          1342-Med Discontinued         Rate: 9 mL/hr Dose: 900 Units/hr  Freq: CONTINUOUS Route: IV  Last Dose: Stopped (03/02/18 1230)  Start: 03/02/18 1118   End: 03/02/18 1243   Admin Instructions: Ordered dose: 12 units/kg/hr x 76.2 kg (adjusted body weight) = 900 units/hr (rounded to the nearest 50 units/hr from 914 units/hr)           1140 (900 Units/hr)-New Bag       1230-ED Infusing on Admission/transfer       1243-Med Discontinued         Dose: 4 mg  Freq: EVERY 15 MIN PRN Route: IV  PRN Reason: other  PRN Comment: pain control or improvement in physical function. Hold dose for analgesic side effects.  Start: 03/02/18 0709   End: 03/02/18 1243   Admin Instructions: Notify the provider to assess for uncontrolled pain or analgesic side effects.  Hold while on IV PCA or with regular IV opioid dosing.  For ordered doses up to 15 mg give IV Push undiluted over 4-5 minutes.           0812 (4 mg)-Given       1243-Med Discontinued         Dose: 0.4 mg  Freq: EVERY 5 MIN PRN Route: SL  PRN Reason: chest pain  PRN Comment: Maximum 3 doses in 15 minutes, if needed.  Start: 03/02/18 0649   End: 03/02/18 1243   Admin Instructions: Notify provider if no relief after 3 doses. Hold for Systolic Blood Pressure less than 90 mmHg  (Hold nitroglycerin if sildenafil (VIAGRA/REVATIO) or avanafil (STENDRA) has been taken within 8 hours; vardenafil (LEVITRA/STAXYN)has been taken within 18 hours;tadalafil  (CIALIS/ADCIRCA) has been taken within 36 hours).           0655 (0.4 mg)-Given       0705 (0.4 mg)-Given       1243-Med Discontinued

## 2018-03-02 NOTE — PLAN OF CARE
Problem: Patient Care Overview  Goal: Plan of Care/Patient Progress Review  Outcome: No Change  A&O X4. Up independently. NPO. VSS, 2L NC. Denies chest pain. Troponin 0.059, 0.084. Heparin gtt 9 mL/hr, IVFs infusing. K 4.0. Echo done today, EF 55-60%. Cardiology following. Tele SB with ST elevation. Plan for angiogram this afternoon, patient denied education material. New medications reviewed with patient and spouse. Continue POC.

## 2018-03-02 NOTE — ED NOTES
Patient presents to ED due to CP . States he woke up suddenly at 0430 when suddenly developed chest tightness, diaphoretic.  Denies SOB     Denies taking OTC meds PTA

## 2018-03-02 NOTE — IP AVS SNAPSHOT
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)      Patient Ethnicity & Race     Ethnic Group Patient Race     White      Emergency Contact(s)     Name Relation Home Work Mobile    PACO STAFFORD Spouse [phone] NONE [phone]      Documents on File        Status Date Received Description       Documents for the Patient    Affiliate Privacy placeholder   phase3    Consent for EHR Access Received 18     Insurance Card Received 18     Patient ID Received 18 2019    [facility] Specified Other       Consent for Services/Privacy Notice - Hospital/Clinic Received 18     Privacy Notice - [place] Received 18     Care Everywhere Prospective Auth       External Medication Information Consent       External Medication Information Consent  (Deleted)         Documents for the Encounter    CMS IM for Patient Signature       Discharge Attachment   HEPARIN INJECTION (ENGLISH)      Admission Information     Attending Provider Admitting Provider Admission Type Admission Date/Time    Franky Clark, Franky Harkins, DO Emergency 18  0635    Discharge Date Hospital Service Auth/Cert Status Service Area     [facility]    Unit Room/Bed Admission Status        3 MEDICAL SURGICAL  Admission (Confirmed)       Admission     Complaint    ACS (acute coronary syndrome) (H)      Hospital Account     Name Acct ID Class Status Primary Coverage    Gil Stafford [id number] Inpatient Open MEDICA - MEDICA CHOICE            Guarantor Account (for Hospital Account #[id number])     Name Relation to Pt Service Area Active? Acct Type    José Luis Gil BELKIS Self FCS Yes  Personal/Family    Address Phone          [address]  [place] [address] [phone](H)  NONE(O)              Coverage Information (for Hospital Account #[id number])     F/O Payor/Plan Precert #    MEDICA/MEDICA CHOICE     Subscriber Subscriber #    Gil Ordonez [id number]    Address Phone    [address] [address]  [place] [address] [phone]

## 2018-03-02 NOTE — IP AVS SNAPSHOT
"[place] [address] MEDICAL SURGICAL: [phone]                                              INTERAGENCY TRANSFER FORM - PHYSICIAN ORDERS   3/2/2018                    Hospital Admission Date: 3/2/2018  BROOKLYN STAFFORD   : 1955  Sex: Male        Attending Provider: Franky Clark DO     Allergies:  No Known Allergies    Infection:  None   Service:  GENERAL MEDI    Ht:  1.727 m (5' 8\")   Wt:  90 kg (198 lb 8 oz)   Admission Wt:  88 kg (194 lb)    BMI:  30.18 kg/m 2   BSA:  2.08 m 2            Patient PCP Information     Provider PCP Type    Fabian Collazo MD General      ED Clinical Impression     Diagnosis Description Comment Added By Time Added    Chest pain, unspecified type [R07.9] Chest pain, unspecified type [R07.9]  Javier Strickland MD 3/2/2018 11:01 AM    Unstable angina (H) [I20.0] Unstable angina (H) [I20.0]  Javier Strickland MD 3/2/2018 11:06 AM      Hospital Problems as of 3/2/2018              Priority Class Noted POA    ACS (acute coronary syndrome) (H) Medium  3/2/2018 Yes      Non-Hospital Problems as of 3/2/2018     None      Code Status History     Date Active Date Inactive Code Status Order ID Comments User Context    This patient has a current code status but no historical code status.         Medication Review      UNREVIEWED medications. Ask your doctor about these medications        Dose / Directions Comments    calcium carbonate 500 MG chewable tablet   Commonly known as:  TUMS        Dose:  2 chew tab   Take 2 chew tab by mouth as needed for heartburn   Refills:  0        fish oil-omega-3 fatty acids 1000 MG capsule        Dose:  2 g   Take 2 g by mouth daily   Refills:  0        multivitamin, therapeutic with minerals Tabs tablet        Dose:  1 tablet   Take 1 tablet by mouth daily   Refills:  0        omeprazole 20 MG tablet        Dose:  20 mg   Take 20 mg by mouth daily   Refills:  0            "

## 2018-03-02 NOTE — ED NOTES
[facility]  ED Nurse Handoff Report    Gil Ordonez is a 62 year old male   ED Chief complaint: Chest Pain  . ED Diagnosis:   Final diagnoses:   Chest pain, unspecified type   Unstable angina (H)     Allergies: No Known Allergies    Code Status: Full Code  Activity level - Baseline/Home:  Independent. Activity Level - Current:   Independent. Lift room needed: No. Bariatric: No   Needed: No   Isolation: No. Infection: Not Applicable.     Vital Signs:   Vitals:    03/02/18 0930 03/02/18 0945 03/02/18 1000 03/02/18 1100   BP: (!) 123/95 110/69 114/65 119/74   Resp:    14   Temp:       TempSrc:       SpO2: 98% 99% 97% 96%   Weight:       Height:           Cardiac Rhythm:  ,   Cardiac  Cardiac Rhythm: Normal sinus rhythm  Pain level: 0-10 Pain Scale: 0  Patient confused: No. Patient Falls Risk: Yes.   Elimination Status: Has voided   Patient Report - Initial Complaint: chest pain. Focused Assessment: Patient reports onset of chest pain this morning that has resolved at this time. Reports shortness of breath, diaphoresis during onset of chest pain. Denies at this time.  Tests Performed: labs, EKG, x-ray. Abnormal Results:   Labs Ordered and Resulted from Time of ED Arrival Up to the Time of Departure from the ED   TROPONIN I - Abnormal; Notable for the following:        Result Value    Troponin I ES 0.059 (*)     All other components within normal limits   CBC WITH PLATELETS DIFFERENTIAL   BASIC METABOLIC PANEL   CBC WITH PLATELETS   PULSE OXIMETRY NURSING   CARDIAC CONTINUOUS MONITORING   PERIPHERAL IV CATHETER   PATIENT CARE ORDER   ISTAT TROPONIN NURSING POCT   PLATELETS MONITORED PER HEPARIN TREATMENT PROTOCOL (FOR MEANINGFUL USE   MEASURE WEIGHT   TROPONIN POCT      Treatments provided: IV heparin, telemetry monitoring, IV morphine, oxygen  Family Comments: family aware patient to be admitted. Not present at bedsided  OBS brochure/video discussed/provided to patient:  N/A  ED  Medications:   Medications   nitroGLYcerin (NITROSTAT) sublingual tablet 0.4 mg (0.4 mg Sublingual Given 3/2/18 0705)   morphine (PF) injection 4 mg (4 mg Intravenous Given 3/2/18 0812)   heparin infusion 25,000 units in 0.45% NaCl 250 mL (900 Units/hr Intravenous New Bag 3/2/18 1140)   nitroGLYcerin (NITROSTAT) 0.4 MG sublingual tablet (0.4 mg  Given 3/2/18 0648)   aspirin chewable tablet 324 mg (324 mg Oral Given 3/2/18 0653)   heparin Loading Dose bolus dose from infusion pump 4,550 Units (4,550 Units Intravenous Given 3/2/18 1140)     Drips infusing:  Yes  For the majority of the shift, the patient's behavior Green. Interventions performed were NA.     Severe Sepsis OR Septic Shock Diagnosis Present: No      ED Nurse Name/Phone Number: Suad Chandler,   11:47 AM    RECEIVING UNIT ED HANDOFF REVIEW    Above ED Nurse Handoff Report was reviewed: Yes  Reviewed by: Radha Do on March 2, 2018 at 11:57 AM

## 2018-03-02 NOTE — PHARMACY-ADMISSION MEDICATION HISTORY
Admission medication history interview status for this patient is complete. See [facility] admission navigator for allergy information, prior to admission medications and immunization status.     Medication history interview source(s):Patient  Medication history resources (including written lists, pill bottles, clinic record):None  Primary pharmacy: [facility] [address] [facility] [address] AT [facility] OF [address]    Changes made to PTA medication list: none    Actions taken by pharmacist (provider contacted, etc):None   Additional medication history information:None  Medication reconciliation/reorder completed by provider prior to medication history? No  Do you take OTC medications (eg tylenol, ibuprofen, fish oil, eye/ear drops, etc)? Yes    Prior to Admission medications    Medication Sig Last Dose Taking? Auth Provider   omeprazole 20 MG tablet Take 20 mg by mouth daily 3/2/2018 at am Yes Unknown, Entered By History   multivitamin, therapeutic with minerals (THERA-VIT-M) TABS tablet Take 1 tablet by mouth daily 3/2/2018 at am Yes Unknown, Entered By History

## 2018-03-02 NOTE — IP AVS SNAPSHOT
[place] [address] [facility]    [address]    [facility] [address]    Phone:  [phone]    Fax:  [phone]                                       After Visit Summary   3/2/2018    Gil Ordonez    MRN: [id number]           After Visit Summary Signature Page     I have received my discharge instructions, and my questions have been answered. I have discussed any challenges I see with this plan with the nurse or doctor.    ..........................................................................................................................................  Patient/Patient Representative Signature      ..........................................................................................................................................  Patient Representative Print Name and Relationship to Patient    ..................................................               ................................................  Date                                            Time    ..........................................................................................................................................  Reviewed by Signature/Title    ...................................................              ..............................................  Date                                                            Time

## 2018-03-02 NOTE — IP AVS SNAPSHOT
"    [place] [address] [facility]: [phone]                                              INTERAGENCY TRANSFER FORM - LAB / IMAGING / EKG / EMG RESULTS   3/2/2018                    Hospital Admission Date: 3/2/2018  BROOKLYN STAFFORD   : 1955  Sex: Male        Attending Provider: Franky Clark DO     Allergies:  No Known Allergies    Infection:  None   Service:  GENERAL MEDI    Ht:  1.727 m (5' 8\")   Wt:  90 kg (198 lb 8 oz)   Admission Wt:  88 kg (194 lb)    BMI:  30.18 kg/m 2   BSA:  2.08 m 2            Patient PCP Information     Provider PCP Type    Fabian Collazo MD General         Lab Results - 3 Days      Troponin I [[id number]] (Abnormal)  Resulted: 18 1448, Result status: Final result    Ordering provider: Preethi Cabrera PA-C  18 1300 Resulting lab: [facility]    Specimen Information    Type Source Collected On   Blood  18 1355          Components       Value Reference Range Flag Lab   Troponin I ES 0.084 0.000 - 0.045 ug/L H FrRdHs   Comment:         The 99th percentile for upper reference range is 0.045 ug/L.  Troponin values   in the range of 0.045 - 0.120 ug/L may be associated with risks of adverse   clinical events.              CBC with platelets [[id number]]  Resulted: 18 1258, Result status: Final result    Ordering provider: Preethi Cabrera PA-C  18 1236 Resulting lab: [facility]    Specimen Information    Type Source Collected On   Blood  18 1251          Components       Value Reference Range Flag Lab   WBC 5.6 4.0 - 11.0 10e9/L  FrRdHs   RBC Count 5.02 4.4 - 5.9 10e12/L  FrRdHs   Hemoglobin 14.5 13.3 - 17.7 g/dL  FrRdHs   Hematocrit 44.7 40.0 - 53.0 %  FrRdHs   MCV 89 78 - 100 fl  FrRdHs   MCH 28.9 26.5 - 33.0 pg  FrRdHs   MCHC 32.4 31.5 - 36.5 g/dL  FrRdHs   RDW 13.1 10.0 - 15.0 %  FrRdHs   Platelet Count 282 150 - 450 10e9/L  FrRdHs            Troponin I [[id number]] (Abnormal)  Resulted: " 03/02/18 1053, Result status: Final result    Ordering provider: Javier Strickland MD  03/02/18 1014 Resulting lab: [facility]    Specimen Information    Type Source Collected On   Blood  03/02/18 1005          Components       Value Reference Range Flag Lab   Troponin I ES 0.059 0.000 - 0.045 ug/L H FrRdHs   Comment:         The 99th percentile for upper reference range is 0.045 ug/L.  Troponin values   in the range of 0.045 - 0.120 ug/L may be associated with risks of adverse   clinical events.              Basic metabolic panel [[id number]]  Resulted: 03/02/18 0714, Result status: Final result    Ordering provider: Javier Strickland MD  03/02/18 0650 Resulting lab: [facility]    Specimen Information    Type Source Collected On   Blood  03/02/18 0610          Components       Value Reference Range Flag Lab   Sodium 141 133 - 144 mmol/L  FrRdHs   Potassium 4.0 3.4 - 5.3 mmol/L  FrRdHs   Chloride 107 94 - 109 mmol/L  FrRdHs   Carbon Dioxide 31 20 - 32 mmol/L  FrRdHs   Anion Gap 3 3 - 14 mmol/L  FrRdHs   Glucose 92 70 - 99 mg/dL  FrRdHs   Urea Nitrogen 20 7 - 30 mg/dL  FrRdHs   Creatinine 0.99 0.66 - 1.25 mg/dL  FrRdHs   GFR Estimate 77 >60 mL/min/1.7m2  FrRdHs   Comment:  Non  GFR Calc   GFR Estimate If Black >90 >60 mL/min/1.7m2  FrRdHs   Comment:  African American GFR Calc   Calcium 9.3 8.5 - 10.1 mg/dL  FrRdHs            CBC with platelets differential [[id number]]  Resulted: 03/02/18 0659, Result status: Final result    Ordering provider: Javier Strickland MD  03/02/18 0650 Resulting lab: [facility]    Specimen Information    Type Source Collected On   Blood  03/02/18 0610          Components       Value Reference Range Flag Lab   WBC 6.0 4.0 - 11.0 10e9/L  FrRdHs   RBC Count 5.36 4.4 - 5.9 10e12/L  FrRdHs   Hemoglobin 15.5 13.3 - 17.7 g/dL  FrRdHs   Hematocrit 47.4 40.0 - 53.0 %  FrRdHs   MCV 88 78 - 100 fl  FrRdHs   MCH 28.9 26.5 - 33.0 pg  [facility]   MCHC  32.7 31.5 - 36.5 g/dL  FrRdHs   RDW 13.1 10.0 - 15.0 %  FrRdHs   Platelet Count 314 150 - 450 10e9/L  FrRdHs   Diff Method Automated Method   FrRdHs   % Neutrophils 55.5 %  FrRdHs   % Lymphocytes 30.1 %  FrRdHs   % Monocytes 9.7 %  FrRdHs   % Eosinophils 3.8 %  FrRdHs   % Basophils 0.7 %  FrRdHs   % Immature Granulocytes 0.2 %  FrRdHs   Nucleated RBCs 0 0 /100  FrRdHs   Absolute Neutrophil 3.3 1.6 - 8.3 10e9/L  FrRdHs   Absolute Lymphocytes 1.8 0.8 - 5.3 10e9/L  FrRdHs   Absolute Monocytes 0.6 0.0 - 1.3 10e9/L  FrRdHs   Absolute Eosinophils 0.2 0.0 - 0.7 10e9/L  FrRdHs   Absolute Basophils 0.0 0.0 - 0.2 10e9/L  FrRdHs   Abs Immature Granulocytes 0.0 0 - 0.4 10e9/L  FrRdHs   Absolute Nucleated RBC 0.0   FrRdHs            Testing Performed By     Lab - Abbreviation Name Director Address Valid Date Range    12 - FrRdHs [facility] Unknown [address] [facility] [address] 05/08/15 1057 - Present            Unresulted Labs (24h ago through future)    Start       Ordered    03/05/18 0600  CBC with platelets  (Heparin Treatment, Pharmacy To Dose- [facility])  EVERY THREE DAYS,   STAT     Comments:  Every 3 days while on heparin    03/02/18 1107    03/05/18 0600  CBC with platelets  (Heparin Treatment Protocol, Pharmacy to Dose- [facility])  EVERY THREE DAYS,   STAT     Comments:  Every 3 days while on heparin    03/02/18 1236    03/03/18 0600  Heparin Xa (10a) Level  (Heparin Treatment, Pharmacy To Dose- [facility])  DAILY,   Routine      03/02/18 1107    03/03/18 0600  Heparin Xa (10a) Level  (Heparin Treatment Protocol, Pharmacy to Dose- [facility])  DAILY,   Routine      03/02/18 1236    03/02/18 1800  Heparin 10a Level  TIMED - ONCE,   Timed      03/02/18 1301    03/02/18 1600  Troponin I  TIMED - ONCE,   Timed      03/02/18 1243    Unscheduled  Heparin Xa (10a) Level  (Heparin Treatment, Pharmacy To Dose- [facility])  CONDITIONAL X 1 STAT,   STAT     Comments:  Release order 6 Hours after heparin is started     18 1107    Unscheduled  Heparin Xa (10a) Level  (Heparin Treatment, Pharmacy To Dose- [facility])  CONDITIONAL (SPECIFY),   Routine     Comments:  Release order 6 hours after any heparin dose change    18 1107    Unscheduled  Heparin Xa (10a) Level  (Heparin Treatment Protocol, Pharmacy to Dose- [facility])  CONDITIONAL X 1 STAT,   STAT     Comments:  Release order 6 Hours after heparin is started    18 1236    Unscheduled  Heparin Xa (10a) Level  (Heparin Treatment Protocol, Pharmacy to Dose- [facility])  CONDITIONAL (SPECIFY),   Routine     Comments:  Release order 6 hours after any heparin dose change    18 1236         Imaging Results - 3 Days      XR Chest Port 1 View [[id number]]  Resulted: 18 0731, Result status: Final result    Ordering provider: Javier Strickland MD  18 0650 Resulted by: Robbin Whaley MD    Performed: 18 07 - 18 07 Resulting lab: RADIOLOGY RESULTS    Narrative:       XR CHEST PORT 1 VW 3/2/2018 7:26 AM    HISTORY: Chest pain.    COMPARISON: None.    FINDINGS: No airspace consolidation, pleural effusion or pneumothorax.  Normal heart size.      Impression:       IMPRESSION: No acute cardiopulmonary abnormality.    ROBBIN WHALEY MD      Testing Performed By     Lab - Abbreviation Name Director Address Valid Date Range    104 - Rad Rslts RADIOLOGY RESULTS Unknown Unknown 05 1553 - Present               ECG/EMG Results      Echo complete [[id number]]  Resulted: 18 1325, Result status: Edited Result - FINAL    Ordering provider: Preethi Cabrera PA-C  18 1236 Resulted by: Prudencio Kahn MD    Performed: 18 1351 - 18 1352 Resulting lab: RADIOLOGY RESULTS    Narrative:       [id number]  [id number]  [id number]  [id number]^PEE^PREETHI^GEM           [facility]  Echocardiography Laboratory  [address] [place] [address]        Name: BROOKLYN STAFFORD  MRN: [id number]  :  1955  Study Date: 03/02/2018 01:25 PM  Age: 62 yrs  Gender: Male  Patient Location: [facility]  Reason For Study: CP, Chest Pressure, Chest Tightness  Ordering Physician: ULYSSES GLASGOW  Referring Physician: Fabian Collazo MD  Performed By: Nancy Anderson [facility]     BSA: 2.0 m2  Height: 68 in  Weight: 198 lb  HR: 63  BP: 119/74 mmHg  _____________________________________________________________________________  __        Procedure  Complete Portable Echo Adult.  _____________________________________________________________________________  __        Interpretation Summary     The visual ejection fraction is estimated at 55-60%.  The right ventricle is normal in structure, function and size.  Mild aortic root dilatation.  There is no comparison study available.  _____________________________________________________________________________  __        Left Ventricle  The left ventricle is normal in structure, function and size. There is normal  left ventricular wall thickness. Left ventricular systolic function is normal.  The visual ejection fraction is estimated at 55-60%. Left ventricular  diastolic function is normal. No regional wall motion abnormalities noted.     Right Ventricle  The right ventricle is normal in structure, function and size.     Atria  Normal left atrial size. Right atrial size is normal. There is no atrial shunt  seen.     Mitral Valve  The mitral valve is normal in structure and function. There is trace mitral  regurgitation.        Tricuspid Valve  The tricuspid valve is normal in structure and function. There is trace  tricuspid regurgitation. The right ventricular systolic pressure is  approximated at 20.2 mmHg plus the right atrial pressure.     Aortic Valve  The aortic valve is normal in structure and function. No aortic regurgitation  is present.     Pulmonic Valve  The pulmonic valve is not well seen, but is grossly normal. There is no  pulmonic valvular regurgitation.      Vessels  Mild aortic root dilatation. The IVC is normal in size and reactivity with  respiration, suggesting normal central venous pressure.     Pericardium  The pericardium appears normal. There is no pleural effusion.        Rhythm  Sinus rhythm was noted.  _____________________________________________________________________________  __  MMode/2D Measurements & Calculations  IVSd: 1.1 cm     LVIDd: 5.0 cm  LVIDs: 3.0 cm  LVPWd: 1.1 cm  FS: 39.2 %  LV mass(C)d: 208.5 grams  LV mass(C)dI: 102.4 grams/m2  Ao root diam: 4.0 cm  LA dimension: 4.2 cm  asc Aorta Diam: 3.5 cm  LA/Ao: 1.1  LVOT diam: 2.5 cm  LVOT area: 4.9 cm2  LA Volume (BP): 65.0 ml  LA Volume Index (BP): 31.9 ml/m2  RWT: 0.44           Doppler Measurements & Calculations  MV E max huber: 65.1 cm/sec  MV A max huber: 56.6 cm/sec  MV E/A: 1.2  MV dec time: 0.29 sec  PA acc time: 0.14 sec  TR max huber: 225.0 cm/sec  TR max P.2 mmHg  E/E' av.9  Lateral E/e': 8.6  Medial E/e': 9.2           _____________________________________________________________________________  __           Report approved by: Guillermo Moon 2018 02:19 PM       1    Type Source Collected On     18 1325          View Image (below)              Encounter-Level Documents:     There are no encounter-level documents.      Order-Level Documents:     There are no order-level documents.

## 2018-03-02 NOTE — IP AVS SNAPSHOT
"` `           [place] [address] [facility]: [phone]                                              INTERAGENCY TRANSFER FORM - NURSING   3/2/2018                    Hospital Admission Date: 3/2/2018  BROOKLYN STAFFORD   : 1955  Sex: Male        Attending Provider: Franky Clark DO     Allergies:  No Known Allergies    Infection:  None   Service:  GENERAL MEDI    Ht:  1.727 m (5' 8\")   Wt:  90 kg (198 lb 8 oz)   Admission Wt:  88 kg (194 lb)    BMI:  30.18 kg/m 2   BSA:  2.08 m 2            Patient PCP Information     Provider PCP Type    Fabian Collazo MD General      Current Code Status     Date Active Code Status Order ID Comments User Context       3/2/2018 12:36 PM Full Code [id number]  Preethi Cabrera PA-C ED       Code Status History     Date Active Date Inactive Code Status Order ID Comments User Context    This patient has a current code status but no historical code status.      Advance Directives        Scanned docmt in ACP Activity?           Yes, scanned ACP docmt        Hospital Problems as of 3/2/2018              Priority Class Noted POA    ACS (acute coronary syndrome) (H) Medium  3/2/2018 Yes      Non-Hospital Problems as of 3/2/2018     None      Immunizations     None         END      ASSESSMENT     Discharge Profile Flowsheet     GASTROINTESTINAL (ADULT,PEDIATRIC,OB)     Inspection of bony prominences  Full 18 1257    GI WDL  WDL 18 1336   Inspection under devices  Full 18 1257    Last Bowel Movement  18 1336   Skin WDL  WDL 18 1257    Passing flatus  yes 18 1336   SAFETY      COMMUNICATION ASSESSMENT     Safety WDL  WDL 18 1257    Patient's communication style  spoken language (English or Bilingual) 18 0637   All Alarms  none present 18 1257    SKIN                        Assessment WDL (Within Defined Limits) Definitions           Safety WDL     Effective: 09/28/15    Row Information: <b>WDL " "Definition:</b> Bed in low position, wheels locked; call light in reach; upper side rails up x 2; ID band on<br> <font color=\"gray\"><i>Item=AS safety wdl>>List=AS safety wdl>>Version=F14</i></font>      Skin WDL     Effective: 09/28/15    Row Information: <b>WDL Definition:</b> Warm; dry; intact; elastic; without discoloration; pressure points without redness<br> <font color=\"gray\"><i>Item=AS skin wdl>>List=AS skin wdl>>Version=F14</i></font>      Vitals     Vital Signs Flowsheet     VITAL SIGNS     Pain Location  Chest 03/02/18 0825    Temp  97.9  F (36.6  C) 03/02/18 1238   Pain Descriptors  Aching 03/02/18 0825    Temp src  Oral 03/02/18 1238   HEIGHT AND WEIGHT      Resp  18 03/02/18 1238   Height  1.727 m (5' 8\") 03/02/18 1244    Heart Rate  52 03/02/18 1238   Height Method  Stated 03/02/18 1244    Pulse/Heart Rate Source  Monitor 03/02/18 0640   Weight  90 kg (198 lb 8 oz) 03/02/18 1244    BP  110/77 03/02/18 1238   BSA (Calculated - sq m)  2.08 03/02/18 1244    BP Location  Right arm 03/02/18 1238   BMI (Calculated)  30.24 03/02/18 1244    OXYGEN THERAPY     POSITIONING      SpO2  95 % 03/02/18 1238   Body Position  independently positioning 03/02/18 1238    O2 Device  Nasal cannula 03/02/18 1238   DAILY CARE      Oxygen Delivery  2 LPM 03/02/18 1238   Activity Management  activity adjusted per tolerance 03/02/18 1238    PAIN/COMFORT     Activity Assistance Provided  independent 03/02/18 1238    Patient Currently in Pain  denies 03/02/18 1238   EKG MONITORING      Preferred Pain Scale  number (Numeric Rating Pain Scale) 03/02/18 1238   Cardiac Regularity  Regular 03/02/18 0825    Patient's Stated Pain Goal  No pain 03/02/18 1238   Cardiac Rhythm  NSR 03/02/18 0825    0-10 Pain Scale  0 03/02/18 1238                 Patient Lines/Drains/Airways Status    Active LINES/DRAINS/AIRWAYS     Name: Placement date: Placement time: Site: Days: Last dressing change:    Peripheral IV 03/02/18 Right 03/02/18   0644      " less than 1             Patient Lines/Drains/Airways Status    Active PICC/CVC     None            Intake/Output Detail Report     None      Case Management/Discharge Planning     Case Management/Discharge Planning Flowsheet     LIVING ENVIRONMENT     QUESTION TO PATIENT: Do you feel safe going back to the place where you are living?  yes 03/02/18 0638    Lives With  spouse 03/02/18 1250   OBSERVATION: Is there reason to believe there has been maltreatment of a vulnerable adult (ie. Physical/Sexual/Emotional abuse, self neglect, lack of adequate food, shelter, medical care, or financial exploitation)?  no 03/02/18 0638    COPING/STRESS     OTHER      Major Change/Loss/Stressor  none 03/02/18 1252   Are you depressed or being treated for depression?  No 03/02/18 1251    ABUSE RISK SCREEN     HOMICIDE RISK      QUESTION TO PATIENT:  Has a member of your family or a partner(now or in the past) intimidated, hurt, manipulated, or controlled you in any way?  no 03/02/18 0638   Feels Like Hurting Others  no 03/02/18 0638

## 2018-03-02 NOTE — PROGRESS NOTES
CONSULT DICTATED  63 yo male with sudden onset CP, diaphoresis this am. ECG shows inferior T wave abnormality but note was present on 2007 ECG as well. Trop 0.05, CXR normal  NO risk factors  Offered monitor trops and depending if they rise either angiogram or stress testing vs. Angiogram now   Explained procedure and risks.  He wants angiogram  NO contraindication to MARIELLE  Normal kidney function, CBC  Last ate at 9am, NPO

## 2018-03-02 NOTE — CONSULTS
Consult Date:  03/02/2018      CARDIOLOGY CONSULTATION      REFERRING PHYSICIAN:  Fabian Collazo MD      REASON FOR REFERRAL:  Chest pain and elevated cardiac enzymes.        HISTORY OF PRESENT ILLNESS:  I have been asked to evaluate Gil Ordonez, a pleasant 62-year-old gentleman, for the above.  He has no prior history of heart disease, in fact, has no cardiac risk factors.  He had a sudden onset of diaphoresis followed by chest tightness after waking up this a.m.  He did try taking Tums, as he does have a history of mild reflux disease, but this was unhelpful.  His tightness was about a 6/10 in severity.  Initially, he did not note any shortness of breath, but had some bilateral arm tingling.  He presented to the emergency room at the prompting of his wife.  His EKG on admission showed some T waves in the inferior leads, but I did review his chart for a previous EKG and found one from 2007 that showed similar T-wave abnormalities, and in fact, I also found stress testing in the past, it looks like in 2007, as well as 2005, which was normal.  He is currently symptom-free.  He denies history of tobacco abuse, no history of hypertension, hyperlipidemia, diabetes or kidney disease.  His father did have a heart attack, but in his mid-80s.  No history of premature coronary disease.      ALLERGIES:  NO KNOWN DRUG ALLERGIES.      MEDICATIONS:  Occasional omeprazole.      PAST MEDICAL HISTORY:  Reflux disease.  He had a recent back surgery with diskectomy about 6 weeks ago.      FAMILY HISTORY:  Again, negative for premature coronary disease.      SOCIAL HISTORY:  He is a lifelong nonsmoker.  He is .      REVIEW OF SYSTEMS:  Please see above.  He has not experienced any previous symptoms of chest pain, chest pain with exertion, shortness of breath with exertion or exercise intolerance.  He has no history of internal bleeding.  Rest of a 10-point review of systems is described as above or is otherwise  unremarkable.      PHYSICAL EXAMINATION:   VITAL SIGNS:  He is mildly hypertensive, but his blood pressure ranges anywhere from 110-159 systolic over  diastolic.  Heart rate is ranging anywhere between 52 and 78, respiratory rate 18.  He is oxygenating at least 96% on room air.  He is afebrile.   GENERAL:  He is a healthy-appearing 62-year-old male in no apparent distress, eupneic on exam and with conversation.   HEENT:  His head is atraumatic and normocephalic.  Pupils are equal and symmetric.  Conjunctivae are clear.  Oropharynx is clear.   NECK:  Supple.  I do not appreciate carotid bruits or jugular venous distention.   SKIN:  Warm, dry without rash or jaundice.   CARDIOVASCULAR:  Tones are regular.  I do not appreciate a murmur, gallop or rub.   LUNGS:  Clear posteriorly.   EXTREMITIES:  He has strong and symmetric pulses in the upper and lower extremities without peripheral edema.  He is alert and oriented.  There are no gross focal neurologic abnormalities.      DIAGNOSTIC DATA:  EKG is described as above, again showing sinus rhythm with T-wave abnormalities inferiorly.  This is unchanged from previous.  A chest x-ray is read by Radiology as normal.  CBC is normal.  Electrolyte panel is normal.  His point of care troponin was 0.01.  Blood test with troponin was 0.059.      ASSESSMENT AND PLAN:  Gil Ordonez is a 62-year-old male with no cardiac risk factors, presenting with sudden onset of chest pain symptoms.  He does have an abnormal EKG, but this is not new and was seen previously about 10 years ago on EKG.  He does have a mildly elevated troponin level at this time and an underlying history of gastroesophageal reflux disease.  He has just been admitted.  He was placed on heparin.  I would recommend following serial troponins and if they continue to rise, proceed to coronary angiogram.  Given that it is Friday afternoon, I did offer him a little bit more aggressive approach and just going forth  with the angiogram.  Otherwise, he may end up waiting over the weekend.  Alternatively, if the troponin normalizes, may consider stress testing in the a.m.  He has opted to undergo coronary angiography right away.  He has been n.p.o. since 9:00 a.m.  I did go through this procedure in detail with him and his wife, as well as explain the possible risks, benefits and alternatives, understanding that he may undergo a percutaneous intervention if it is felt necessary by the interventionalist, requiring stent placement and dual antiplatelet therapy.  He has an understanding and is willing to proceed.  Therefore, I will go ahead and order a coronary angiogram for this afternoon.  We will follow along in his care.  Please feel free to contact me with any questions you have in regards to his care.         DARRYL CRUZ DO             D: 2018   T: 2018   MT: LATRELL      Name:     BROOKLYN STAFFORD   MRN:      [id number]        Account:       [id number]   :      1955           Consult Date:  2018      Document: [id number]       cc: Fabian Collazo MD

## 2018-03-02 NOTE — PLAN OF CARE
Problem: Cardiac: ACS (Acute Coronary Syndrome) (Adult)  Goal: Signs and Symptoms of Listed Potential Problems Will be Absent, Minimized or Managed (Cardiac: ACS)  Signs and symptoms of listed potential problems will be absent, minimized or managed by discharge/transition of care (reference Cardiac: ACS (Acute Coronary Syndrome) (Adult) CPG).   Outcome: Adequate for Discharge Date Met: 03/02/18  Pt alert and oriented, denies chest pains or dizziness, receiving heparin drip at 900 units per hour via pump.  Pt Discharged to [facility] via stretcher with discharge instructions,  heparin drip and oxygen on 2 L per nasal cannula for comfort.  Pt's family at bedside, took all belongings with them.  Pt to go to cath lab for angiogram on arrival to [facility].

## 2018-03-02 NOTE — IP AVS SNAPSHOT
"                  MRN:[id number]                      After Visit Summary   3/2/2018    Gil Ordonez    MRN: [id number]           Thank you!     Thank you for choosing [facility] for your care. Our goal is always to provide you with excellent care. Hearing back from our patients is one way we can continue to improve our services. Please take a few minutes to complete the written survey that you may receive in the mail after you visit. If you would like to speak to someone directly about your visit please contact Patient Relations at [phone]. Thank you!          Patient Information     Date Of Birth          1955        Designated Caregiver       Most Recent Value    Caregiver    Will someone help with your care after discharge? no      About your hospital stay     You were admitted on:  March 2, 2018 You last received care in the:  [place] [address] [facility]    You were discharged on:  March 2, 2018       Who to Call     For medical emergencies, please call 911.  For non-urgent questions about your medical care, please call your primary care provider or clinic, [phone]          Attending Provider     Provider Specialty    Javier Strickland MD Emergency Medicine    Franky Clark, DO Internal Medicine       Primary Care Provider Office Phone # Fax #    Fabian Collazo [phone]      Pending Results     No orders found from 2/28/2018 to 3/3/2018.            Admission Information     Date & Time Provider Department Dept. Phone    3/2/2018 Franky Clark, DO [place] [address] [facility] [phone]      Your Vitals Were     Blood Pressure Temperature Respirations Height Weight Pulse Oximetry    110/77 (BP Location: Right arm) 97.9  F (36.6  C) (Oral) 18 1.727 m (5' 8\") 90 kg (198 lb 8 oz) 95%    BMI (Body Mass Index)                   30.18 kg/m2           MyChart Information     [email or web address] lets you send messages to your doctor, view your test " "results, renew your prescriptions, schedule appointments and more. To sign up, go to [email or web address] . Click on \"Log in\" on the left side of the screen, which will take you to the Welcome page. Then click on \"Sign up Now\" on the right side of the page.     You will be asked to enter the access code listed below, as well as some personal information. Please follow the directions to create your username and password.     Your access code is: [email or web address]  Expires: 2018  3:53 PM     Your access code will  in 90 days. If you need help or a new code, please call your [facility] clinic or [phone].        Care EveryWhere ID     This is your Care EveryWhere ID. This could be used by other organizations to access your [facility] medical records  [id number]        Equal Access to Services     MEME WHIPPLE : Amie Fraga, mason laughlin, vladislav smythalvashti zacarias, ava paredes . So [facility] [phone].    ATENCIÓN: Si habla español, tiene a peck disposición servicios gratuitos de asistencia lingüística. Paulie al [phone].    We comply with applicable federal civil rights laws and [place] laws. We do not discriminate on the basis of race, color, national origin, age, disability, sex, sexual orientation, or gender identity.               Review of your medicines      UNREVIEWED medicines. Ask your doctor about these medicines        Dose / Directions    calcium carbonate 500 MG chewable tablet   Commonly known as:  TUMS        Dose:  2 chew tab   Take 2 chew tab by mouth as needed for heartburn   Refills:  0       fish oil-omega-3 fatty acids 1000 MG capsule        Dose:  2 g   Take 2 g by mouth daily   Refills:  0       multivitamin, therapeutic with minerals Tabs tablet        Dose:  1 tablet   Take 1 tablet by mouth daily   Refills:  0       omeprazole 20 MG tablet        Dose:  20 mg   Take 20 mg by mouth daily   Refills:  0                Protect others " around you: Learn how to safely use, store and throw away your medicines at www.disposemymeds.org.             Medication List: This is a list of all your medications and when to take them. Check marks below indicate your daily home schedule. Keep this list as a reference.      Medications           Morning Afternoon Evening Bedtime As Needed    calcium carbonate 500 MG chewable tablet   Commonly known as:  TUMS   Take 2 chew tab by mouth as needed for heartburn                                fish oil-omega-3 fatty acids 1000 MG capsule   Take 2 g by mouth daily                                multivitamin, therapeutic with minerals Tabs tablet   Take 1 tablet by mouth daily                                omeprazole 20 MG tablet   Take 20 mg by mouth daily                                          More Information        Heparin injection  Brand Names: Hepflush-10, Hep-Lock, Hep-Lock U/P, Monoject Prefill Advanced Heparin Lock Flush  What is this medicine?  HEPARIN (HEP a rin) is an anticoagulant. It is used to treat or prevent clots in the veins, arteries, lungs, or heart. It stops clots from forming or getting bigger. This medicine prevents clotting during open-heart surgery, dialysis, or in patients who are confined to bed.  How should I use this medicine?  This medicine is given by injection or infusion into a vein. It can also be given by injection of small amounts under the skin. It is usually given by a health care professional in a hospital or clinic setting.  If you get this medicine at home, you will be taught how to prepare and give this medicine. Use exactly as directed. Take your medicine at regular intervals. Do not take it more often than directed. Do not stop taking except on your doctor's advice. Stopping this medicine may increase your risk of a blot clot. Be sure to refill your prescription before you run out of medicine.  It is important that you put your used needles and syringes in a special  sharps container. Do not put them in a trash can. If you do not have a sharps container, call your pharmacist or healthcare provider to get one.  Talk to your pediatrician regarding the use of this medicine in children. While this medicine may be prescribed for children for selected conditions, precautions do apply.  What side effects may I notice from receiving this medicine?  Side effects that you should report to your doctor or health care professional as soon as possible:    allergic reactions like skin rash, itching or hives, swelling of the face, lips, or tongue    back pain    burning or itching on the bottoms of the feet    cold, blue, or painful hands and feet    feeling faint or lightheaded, falls    fever, chills    nausea, vomiting    signs and symptoms of bleeding such as bloody or black, tarry stools; red or dark-brown urine; spitting up blood or brown material that looks like coffee grounds; red spots on the skin; unusual bruising or bleeding from the eye, gums, or nose    stomach pain    unusually low blood pressure  Side effects that usually do not require medical attention (report to your doctor or health care professional if they continue or are bothersome):    pain, redness, or irritation at site where injected  What may interact with this medicine?  Do not take this medicine with any of the following medications:    aspirin and aspirin-like drugs    mifepristone    medicines that treat or prevent blood clots like warfarin, enoxaparin, and dalteparin    palifermin    protamine  This medicine may also interact with the following medications:    dextran    digoxin    hydroxychloroquine    medicines for treating colds or allergies    nicotine    NSAIDs, medicines for pain and inflammation, like ibuprofen or naproxen    phenylbutazone    tetracycline antibiotics  What if I miss a dose?  If you miss a dose, take it as soon as you can. If it is almost time for your next dose, take only that dose. Do  not take double or extra doses.  Where should I keep my medicine?  Keep out of the reach of children.  Store unopened vials at room temperature between 15 and 30 degrees C (59 and 86 degrees F). Do not freeze. Do not use if solution is discolored or particulate matter is present. Throw away any unused medicine after the expiration date.  What should I tell my health care provider before I take this medicine?  They need to know if you have any of these conditions:    bleeding disorders, such as hemophilia or low blood platelets    bowel disease or diverticulitis    endocarditis    high blood pressure    liver disease    recent surgery or delivery of a baby    stomach ulcers    an unusual or allergic reaction to heparin, benzyl alcohol, sulfites, other medicines, foods, dyes, or preservatives    pregnant or trying to get pregnant    breast-feeding  What should I watch for while using this medicine?  While you are taking this medicine, carry an identification card with your name, the name and dose of medicine(s) being used, and the name and phone number of your doctor or health care professional or person to contact in an emergency.  Notify your doctor or health care professional and seek emergency treatment if you develop breathing problems; changes in vision; chest pain; severe, sudden headache; pain, swelling, warmth in the leg; trouble speaking; sudden numbness or weakness of the face, arm, or leg. These can be signs that your condition has gotten worse.  Notify your doctor or health care professional at once if you have cold, blue hands or feet.  If you are going to have surgery or dental work, tell your doctor or health care professional that you have received this medicine. Be careful brushing and flossing your teeth or using a toothpick while receiving this medicine because you may bleed more easily.  Avoid sports and activities that might cause injury while you are using this medicine. Severe falls or injuries  can cause unseen bleeding. Be careful when using sharp tools or knives. Consider using an electric razor.  NOTE:This sheet is a summary. It may not cover all possible information. If you have questions about this medicine, talk to your doctor, pharmacist, or health care provider. Copyright  2017 [employer]

## 2018-03-02 NOTE — IP AVS SNAPSHOT
[facility]    [address]    [facility] [address]    Phone:  [phone]                                       After Visit Summary   3/2/2018    Gil Ordonez    MRN: [id number]           After Visit Summary Signature Page     I have received my discharge instructions, and my questions have been answered. I have discussed any challenges I see with this plan with the nurse or doctor.    ..........................................................................................................................................  Patient/Patient Representative Signature      ..........................................................................................................................................  Patient Representative Print Name and Relationship to Patient    ..................................................               ................................................  Date                                            Time    ..........................................................................................................................................  Reviewed by Signature/Title    ...................................................              ..............................................  Date                                                            Time

## 2018-03-02 NOTE — IP AVS SNAPSHOT
` `     [place] [address] [facility]: [phone]                 INTERAGENCY TRANSFER FORM - NOTES (H&P, Discharge Summary, Consults, Procedures, Therapies)   3/2/2018                    Hospital Admission Date: 3/2/2018  BROOKLYN STAFFORD   : 1955  Sex: Male        Patient PCP Information     Provider PCP Type    Fabian Collazo MD General      History & Physicals     No notes of this type exist for this encounter.      Discharge Summaries     No notes of this type exist for this encounter.      Consult Notes     No notes of this type exist for this encounter.         Progress Notes - Physician (Notes from 18 through 18)      Progress Notes by Karin Bella DO at 3/2/2018  1:22 PM     Author:  Karin Bella DO Service:  Cardiology Author Type:  Physician    Filed:  3/2/2018  1:26 PM Date of Service:  3/2/2018  1:22 PM Creation Time:  3/2/2018  1:22 PM    Status:  Addendum :  Karin Bella DO (Physician)         CONSULT DICTATED[CD1.1]  63 yo male with sudden onset CP, diaphoresis this am. ECG shows inferior T wave abnormality but note was present on  ECG as well. Trop 0.05, CXR normal  NO risk factors  Offered monitor trops and depending if they rise either angiogram or stress testing vs. Angiogram now   Explained procedure and risks.  He wants angiogram  NO contraindication to MARIELLE  Normal kidney function, CBC  Last ate at 9am, NPO[CD1.2]     Revision History        User Key Date/Time User Provider Type Action    > CD1.1 3/2/2018  1:26 PM Karin Bella DO Physician Addend     CD1.2 3/2/2018  1:25 PM Karin Bella DO Physician Sign            ED Notes by Radha Do RN at 3/2/2018 11:46 AM     Author:  Radha Do RN Service:  Nursing Author Type:  Registered Nurse    Filed:  3/2/2018 11:57 AM Date of Service:  3/2/2018 11:46 AM Creation Time:  3/2/2018 11:46 AM    Status:  Addendum  :  Radha Do, RN (Registered Nurse)         [facility]  ED Nurse Handoff Report    Gil Ordonez is a 62 year old male   ED Chief complaint:[ER1.1] Chest Pain[ER1.2]  . ED Diagnosis:[ER1.1]   Final diagnoses:   Chest pain, unspecified type   Unstable angina (H)[ER1.2]     Allergies:[ER1.1] No Known Allergies[ER1.2]    Code Status: Full Code  Activity level - Baseline/Home:  Independent. Activity Level - Current:   Independent. Lift room needed: No. Bariatric: No   Needed: No   Isolation: No. Infection: Not Applicable.     Vital Signs:[ER1.1]   Vitals:    03/02/18 0930 03/02/18 0945 03/02/18 1000 03/02/18 1100   BP: (!) 123/95 110/69 114/65 119/74   Resp:    14   Temp:       TempSrc:       SpO2: 98% 99% 97% 96%   Weight:       Height:[ER1.2]           Cardiac Rhythm:  ,[ER1.1]   Cardiac  Cardiac Rhythm: Normal sinus rhythm[ER1.2]  Pain level:[ER1.1] 0-10 Pain Scale: 0[ER1.2]  Patient confused: No. Patient Falls Risk: Yes.   Elimination Status: Has voided   Patient Report - Initial Complaint: chest pain. Focused Assessment: Patient reports onset of chest pain[ER1.1] this morning that has resolved at this time. Reports shortness of breath, diaphoresis during onset of chest pain. Denies at this time.[ER1.3]  Tests Performed:[ER1.1] labs, EKG, x-ray[ER1.3]. Abnormal Results:[ER1.1]   Labs Ordered and Resulted from Time of ED Arrival Up to the Time of Departure from the ED   TROPONIN I - Abnormal; Notable for the following:        Result Value    Troponin I ES 0.059 (*)     All other components within normal limits   CBC WITH PLATELETS DIFFERENTIAL   BASIC METABOLIC PANEL   CBC WITH PLATELETS   PULSE OXIMETRY NURSING   CARDIAC CONTINUOUS MONITORING   PERIPHERAL IV CATHETER   PATIENT CARE ORDER   ISTAT TROPONIN NURSING POCT   PLATELETS MONITORED PER HEPARIN TREATMENT PROTOCOL (FOR MEANINGFUL USE   MEASURE WEIGHT   TROPONIN POCT[ER1.4]      Treatments provided:[ER1.1] IV  heparin, telemetry monitoring, IV morphine, oxygen[ER1.3]  Family Comments:[ER1.1] family aware patient to be admitted. Not present at bedsided[ER1.3]  OBS brochure/video discussed/provided to patient:[ER1.1]  N/A[ER1.3]  ED Medications:[ER1.1]   Medications   nitroGLYcerin (NITROSTAT) sublingual tablet 0.4 mg (0.4 mg Sublingual Given 3/2/18 0705)   morphine (PF) injection 4 mg (4 mg Intravenous Given 3/2/18 0812)   heparin infusion 25,000 units in 0.45% NaCl 250 mL (900 Units/hr Intravenous New Bag 3/2/18 1140)   nitroGLYcerin (NITROSTAT) 0.4 MG sublingual tablet (0.4 mg  Given 3/2/18 0648)   aspirin chewable tablet 324 mg (324 mg Oral Given 3/2/18 0653)   heparin Loading Dose bolus dose from infusion pump 4,550 Units (4,550 Units Intravenous Given 3/2/18 1140)[ER1.2]     Drips infusing:[ER1.1]  Yes[ER1.3]  For the majority of the shift, the patient's behavior[ER1.1] Green[ER1.3]. Interventions performed were[ER1.1] NA[ER1.3].     Severe Sepsis OR Septic Shock Diagnosis Present:[ER1.1] No[ER1.3]      ED Nurse Name/Phone Number: Suad Ramesh,   11:47 AM[ER1.1]    RECEIVING UNIT ED HANDOFF REVIEW    Above ED Nurse Handoff Report was reviewed: Yes  Reviewed by: Radha Do on March 2, 2018 at 11:57 AM[CP1.1]        Revision History        User Key Date/Time User Provider Type Action    > CP1.1 3/2/2018 11:57 AM Radha Do, RN Registered Nurse Addend     ER1.4 3/2/2018 11:52 AM Suad Chandler, RN Registered Nurse Sign     ER1.3 3/2/2018 11:49 AM Suad Chandler, RN Registered Nurse      ER1.2 3/2/2018 11:47 AM Suad Chandler, RN Registered Nurse      ER1.1 3/2/2018 11:46 AM Suad Chandler, RN Registered Nurse             ED Notes by Maria Eugenia Curtis, RN at 3/2/2018  7:43 AM     Author:  Maria Eugenia Curtis RN Service:  (none) Author Type:  Registered Nurse    Filed:  3/2/2018  7:44 AM Date of Service:  3/2/2018  7:43 AM Creation Time:  3/2/2018  7:44 AM    Status:  Signed :  Kirsten  Maria Eugenia TAMAYO RN (Registered Nurse)         Pt restiing comfortably in bed.  C/o very mild discomfort in chest.  No needs at this time.[CH1.1]      Revision History        User Key Date/Time User Provider Type Action    > CH1.1 3/2/2018  7:44 AM Maria Eugenia Curtis RN Registered Nurse Sign            ED Notes by Hedy High RN at 3/2/2018  6:38 AM     Author:  Hedy High RN Service:  (none) Author Type:  Registered Nurse    Filed:  3/2/2018  6:39 AM Date of Service:  3/2/2018  6:38 AM Creation Time:  3/2/2018  6:38 AM    Status:  Signed :  Hedy High RN (Registered Nurse)         Patient presents to ED due to CP . States he woke up suddenly at 0430 when suddenly developed chest tightness, diaphoretic.  Denies SOB     Denies taking OTC meds PTA[LG1.1]      Revision History        User Key Date/Time User Provider Type Action    > LG1.1 3/2/2018  6:39 AM Hedy High RN Registered Nurse Sign                  Procedure Notes     No notes of this type exist for this encounter.      Progress Notes - Therapies (Notes from 02/27/18 through 03/02/18)     No notes of this type exist for this encounter.

## 2018-03-03 ENCOUNTER — APPOINTMENT (OUTPATIENT)
Dept: OCCUPATIONAL THERAPY | Facility: CLINIC | Age: 63
DRG: 246 | End: 2018-03-03
Payer: COMMERCIAL

## 2018-03-03 VITALS
RESPIRATION RATE: 19 BRPM | BODY MASS INDEX: 29.76 KG/M2 | WEIGHT: 195.7 LBS | OXYGEN SATURATION: 98 % | SYSTOLIC BLOOD PRESSURE: 120 MMHG | DIASTOLIC BLOOD PRESSURE: 67 MMHG

## 2018-03-03 LAB
ANION GAP SERPL CALCULATED.3IONS-SCNC: 5 MMOL/L (ref 3–14)
BUN SERPL-MCNC: 16 MG/DL (ref 7–30)
CALCIUM SERPL-MCNC: 8.6 MG/DL (ref 8.5–10.1)
CHLORIDE SERPL-SCNC: 107 MMOL/L (ref 94–109)
CO2 SERPL-SCNC: 28 MMOL/L (ref 20–32)
CREAT SERPL-MCNC: 0.99 MG/DL (ref 0.66–1.25)
ERYTHROCYTE [DISTWIDTH] IN BLOOD BY AUTOMATED COUNT: 13.5 % (ref 10–15)
GFR SERPL CREATININE-BSD FRML MDRD: 77 ML/MIN/1.7M2
GLUCOSE SERPL-MCNC: 80 MG/DL (ref 70–99)
HCT VFR BLD AUTO: 44 % (ref 40–53)
HGB BLD-MCNC: 14.9 G/DL (ref 13.3–17.7)
MCH RBC QN AUTO: 29.7 PG (ref 26.5–33)
MCHC RBC AUTO-ENTMCNC: 33.9 G/DL (ref 31.5–36.5)
MCV RBC AUTO: 88 FL (ref 78–100)
PLATELET # BLD AUTO: 241 10E9/L (ref 150–450)
POTASSIUM SERPL-SCNC: 4 MMOL/L (ref 3.4–5.3)
RBC # BLD AUTO: 5.02 10E12/L (ref 4.4–5.9)
SODIUM SERPL-SCNC: 140 MMOL/L (ref 133–144)
WBC # BLD AUTO: 8.4 10E9/L (ref 4–11)

## 2018-03-03 PROCEDURE — 36415 COLL VENOUS BLD VENIPUNCTURE: CPT

## 2018-03-03 PROCEDURE — 25000132 ZZH RX MED GY IP 250 OP 250 PS 637: Performed by: INTERNAL MEDICINE

## 2018-03-03 PROCEDURE — 93010 ELECTROCARDIOGRAM REPORT: CPT | Performed by: INTERNAL MEDICINE

## 2018-03-03 PROCEDURE — 97165 OT EVAL LOW COMPLEX 30 MIN: CPT | Mod: GO

## 2018-03-03 PROCEDURE — 97530 THERAPEUTIC ACTIVITIES: CPT | Mod: GO

## 2018-03-03 PROCEDURE — 80048 BASIC METABOLIC PNL TOTAL CA: CPT

## 2018-03-03 PROCEDURE — 97110 THERAPEUTIC EXERCISES: CPT | Mod: GO

## 2018-03-03 PROCEDURE — 93005 ELECTROCARDIOGRAM TRACING: CPT

## 2018-03-03 PROCEDURE — 99238 HOSP IP/OBS DSCHRG MGMT 30/<: CPT | Performed by: HOSPITALIST

## 2018-03-03 PROCEDURE — 85027 COMPLETE CBC AUTOMATED: CPT

## 2018-03-03 PROCEDURE — 25000132 ZZH RX MED GY IP 250 OP 250 PS 637: Performed by: HOSPITALIST

## 2018-03-03 PROCEDURE — 99231 SBSQ HOSP IP/OBS SF/LOW 25: CPT | Performed by: INTERNAL MEDICINE

## 2018-03-03 PROCEDURE — 40000133 ZZH STATISTIC OT WARD VISIT

## 2018-03-03 PROCEDURE — 25000132 ZZH RX MED GY IP 250 OP 250 PS 637

## 2018-03-03 RX ORDER — NITROGLYCERIN 0.4 MG/1
TABLET SUBLINGUAL
Qty: 25 TABLET | Refills: 0 | Status: SHIPPED | OUTPATIENT
Start: 2018-03-03

## 2018-03-03 RX ORDER — ATORVASTATIN CALCIUM 80 MG/1
80 TABLET, FILM COATED ORAL DAILY
Status: DISCONTINUED | OUTPATIENT
Start: 2018-03-03 | End: 2018-03-03 | Stop reason: HOSPADM

## 2018-03-03 RX ORDER — ATORVASTATIN CALCIUM 80 MG/1
80 TABLET, FILM COATED ORAL DAILY
Qty: 30 TABLET | Refills: 2 | Status: SHIPPED | OUTPATIENT
Start: 2018-03-04

## 2018-03-03 RX ORDER — METOPROLOL SUCCINATE 25 MG/1
25 TABLET, EXTENDED RELEASE ORAL DAILY
Qty: 30 TABLET | Refills: 2 | Status: SHIPPED | OUTPATIENT
Start: 2018-03-04

## 2018-03-03 RX ADMIN — TICAGRELOR 90 MG: 90 TABLET ORAL at 06:28

## 2018-03-03 RX ADMIN — OMEPRAZOLE 20 MG: 20 CAPSULE, DELAYED RELEASE ORAL at 07:15

## 2018-03-03 RX ADMIN — ASPIRIN 81 MG: 81 TABLET, COATED ORAL at 08:53

## 2018-03-03 RX ADMIN — ATORVASTATIN CALCIUM 80 MG: 80 TABLET, FILM COATED ORAL at 08:53

## 2018-03-03 RX ADMIN — METOPROLOL SUCCINATE 25 MG: 25 TABLET, EXTENDED RELEASE ORAL at 08:53

## 2018-03-03 NOTE — DISCHARGE SUMMARY
[facility]    Discharge Summary  Hospitalist    Date of Admission:  3/2/2018  Date of Discharge:  3/3/2018  Discharging Provider: Wallace Aceves  Date of Service (when I saw the patient): 03/03/18    Discharge Diagnoses   NSTEMI  GERD    History of Present Illness   Gil Ordonez is an 62 year old male who transferred from outside hospital to undergo angiogram for NSTEMI.    Hospital Course   Gil Ordonez was admitted on 3/2/2018.  The following problems were addressed during his hospitalization:    NSTEMI:  Presented with substernal chest tightness associated with diaphoresis, EKG with non-specific T-wave changes unchanged from prior but troponin mildly elevated and trending up.  He is now s/p PCI with multiple stents to the RCA.  TTE 3/2 with EF 55-60% with no WMA.  He was initiated on aspirin, atorvastatin, metoprolol, Brilinta as ordered by Cardiology and will have follow up with Cardiology clinic and cardiology rehab in addition to PCP following discharge.     GERD:  Continue PTA omeprazole.    # Discharge Pain Plan:   - Patient currently has NO PAIN and is not being prescribed pain medications on discharge.      Wallace Aceves    Significant Results and Procedures   Coronary angiogram    Pending Results   These results will be followed up by: N/A  Unresulted Labs Ordered in the Past 30 Days of this Admission     No orders found for last 61 day(s).          Code Status   Full Code       Primary Care Physician   Fabian Collazo    Constitutional: well developed, well nourished male in no acute distress  Respiratory: lungs clear to auscultation bilaterally, no crackles or wheezes, no tachypnea  Cardiovascular: regular rate and rhythm, normal S1/S2, no murmur, rubs or gallops  GI: abdomen soft, non-tender, non-distended, normal bowel sounds  Lymph/Hematologic: No peripheral edema  Musculoskeletal: Extremities warm and well perfused  Neurologic: alert and appropriate, cranial nerves grossly  intact, gross motor movements intact  Psychiatric: normal affect    Discharge Disposition   Discharged to home  Condition at discharge: Stable    Consultations This Hospital Stay   CARDIAC REHAB IP CONSULT  NUTRITION SERVICES ADULT IP CONSULT  PHARMACY IP CONSULT  PHARMACY IP CONSULT  CARDIOLOGY IP CONSULT  SMOKING CESSATION PROGRAM IP CONSULT    Time Spent on this Encounter   IWallace, personally saw the patient today and spent less than or equal to 30 minutes discharging this patient.    Discharge Orders     CARDIAC REHAB REFERRAL     Reason for your hospital stay   You were admitted for heart attack for which you had stents placed.     Follow-up and recommended labs and tests    You will be contacted by Cardiology clinic to schedule outpatient follow up.  Follow up with primary care provider in 1-2 weeks.  Follow up with cardiac rehab as outpatient.     Activity   Your activity upon discharge: activity as tolerated     Full Code     Diet   Follow this diet upon discharge:      Combination Diet Low Saturated Fat Na <2400mg Diet       Discharge Medications   Current Discharge Medication List      START taking these medications    Details   aspirin EC 81 MG EC tablet Take 1 tablet (81 mg) by mouth daily  Qty: 90 tablet, Refills: 2    Comments: Future refills by PCP Dr. Fabian Collazo with phone number [phone].  Associated Diagnoses: NSTEMI (non-ST elevated myocardial infarction) (H)      nitroGLYcerin (NITROSTAT) 0.4 MG sublingual tablet For chest pain place 1 tablet under the tongue every 5 minutes for 3 doses. If symptoms persist 5 minutes after 1st dose call 911.  Qty: 25 tablet, Refills: 0    Comments: Future refills by PCP Dr. Fabian Collazo with phone number [phone].  Associated Diagnoses: NSTEMI (non-ST elevated myocardial infarction) (H)      atorvastatin (LIPITOR) 80 MG tablet Take 1 tablet (80 mg) by mouth daily  Qty: 30 tablet, Refills: 2    Comments: Future refills by PCP   Fabian Collazo with phone number [phone].  Associated Diagnoses: NSTEMI (non-ST elevated myocardial infarction) (H)      metoprolol succinate (TOPROL-XL) 25 MG 24 hr tablet Take 1 tablet (25 mg) by mouth daily  Qty: 30 tablet, Refills: 2    Comments: Future refills by PCP Dr. Fabian Collazo with phone number [phone].  Associated Diagnoses: NSTEMI (non-ST elevated myocardial infarction) (H)      ticagrelor (BRILINTA) 90 MG tablet Take 1 tablet (90 mg) by mouth every 12 hours  Qty: 60 tablet, Refills: 11    Associated Diagnoses: NSTEMI (non-ST elevated myocardial infarction) (H)         CONTINUE these medications which have NOT CHANGED    Details   omeprazole 20 MG tablet Take 20 mg by mouth daily      multivitamin, therapeutic with minerals (THERA-VIT-M) TABS tablet Take 1 tablet by mouth daily      fish oil-omega-3 fatty acids 1000 MG capsule Take 2 g by mouth daily      calcium carbonate (TUMS) 500 MG chewable tablet Take 2 chew tab by mouth as needed for heartburn           Allergies   No Known Allergies  Data   Most Recent 3 CBC's:  Recent Labs   Lab Test  03/03/18   0530  03/02/18   1251  03/02/18   0610   WBC  8.4  5.6  6.0   HGB  14.9  14.5  15.5   MCV  88  89  88   PLT  241  282  314      Most Recent 3 BMP's:  Recent Labs   Lab Test  03/03/18   0530  03/02/18   0610   NA  140  141   POTASSIUM  4.0  4.0   CHLORIDE  107  107   CO2  28  31   BUN  16  20   CR  0.99  0.99   ANIONGAP  5  3   ELIS  8.6  9.3   GLC  80  92     Most Recent 3 Troponin's:  Recent Labs   Lab Test  03/02/18   1355  03/02/18   1005  03/02/18   0648   TROPI  0.084*  0.059*   --    TROPONIN   --    --   0.01       Results for orders placed or performed during the hospital encounter of 03/02/18   XR Chest Port 1 View    Narrative    XR CHEST PORT 1 VW 3/2/2018 7:26 AM    HISTORY: Chest pain.    COMPARISON: None.    FINDINGS: No airspace consolidation, pleural effusion or pneumothorax.  Normal heart size.      Impression     IMPRESSION: No acute cardiopulmonary abnormality.    LAURO CANTRELL MD

## 2018-03-03 NOTE — PHARMACY-ADMISSION MEDICATION HISTORY
Admission Medication History    Admission medication history interview status for the 3/2/2018 admission is complete.      Med history collected at [facility] and carried forward on transfer.    Prior to Admission medications    Medication Sig Last Dose Taking? Auth Provider   omeprazole 20 MG tablet Take 20 mg by mouth daily 3/2/2018 at Unknown time Yes Unknown, Entered By History   multivitamin, therapeutic with minerals (THERA-VIT-M) TABS tablet Take 1 tablet by mouth daily 3/2/2018 at Unknown time Yes Unknown, Entered By History   fish oil-omega-3 fatty acids 1000 MG capsule Take 2 g by mouth daily  Yes Unknown, Entered By History   calcium carbonate (TUMS) 500 MG chewable tablet Take 2 chew tab by mouth as needed for heartburn  Yes Unknown, Entered By History       Irwin Ramos, PharmD

## 2018-03-03 NOTE — PLAN OF CARE
Problem: Patient Care Overview  Goal: Plan of Care/Patient Progress Review  OT/CR: Orders received, eval completed and treatment initiated. Pt is a 62 year old male with past history GERD who presents with chest pain, found to have NSTEMI and transferred to [facility] for angiogram. Pt s/p PCI to RCA. Pt lives in multi level home with spouse, IND with ADLs, spouse was completing IADLs d/t spinal surgery 6 weeks ago.     Discharge Planner OT   Patient plan for discharge: Home with spouse, OP CR (okay for [facility] or [facility] as one is close to home and other close to work)  Current status:  Educated on MI packet- signs/symptoms of activty intolerance, activity modifications, risk factors, OMNI scale, walking program, and OP CR. Pt and spouse with multiple questions, encouraged to speak with cardiology. Pt IND with ambulation in hallways, able to complete 10 mins on treadmill with progression to 2.2 mph, MAX HR 90. Pt able to complete 15 steps. No c/o signs/symptoms. BP initall 101/66, HR 65, post activity /67, HR 81. VSS  Barriers to return to prior living situation: None anticipated.   Recommendations for discharge: Recommend home with spouse A with heavy IADLs (driving, cleaning, laundry, cooking) per restrictions, and OP CR. Pt open to [facility] or [facility]  Rationale for recommendations: Pt would benefit from continued skilled CR for monitored CV strengthening and education.        Entered by: Madie Jeffries 03/03/2018 11:23 AM         Occupational Therapy Discharge Summary    Reason for therapy discharge:    Discharged to home with outpatient therapy.    Progress towards therapy goal(s). See goals on Care Plan in [facility] electronic health record for goal details.  Goals partially met.  Barriers to achieving goals:   discharge on same date as initial evaluation.    Therapy recommendation(s):    Continue home exercise program.  Recommend home with spouse A with heavy IADLs (driving, cleaning, laundry, cooking) per restrictions, and  OP CR. Pt open to [facility] or [facility]

## 2018-03-03 NOTE — CONSULTS
[facility]    Cardiology Progress Note    Date of Service (when I saw the patient): 03/03/2018     Assessment & Plan   Gil Ordonez is a 62 year old male who was admitted on 3/2/2018.     Active Problems:    NSTEMI (non-ST elevated myocardial infarction) (H)    Assessment: Patient underwent RCA stenting yesterday successfully.  He is on dual antiplatelet therapy, high-dose statin, and a beta-blocker.  Inpatient cardiac rehab has been consulted.  He is asymptomatic and his access site is acceptable.    Plan: Continue current medical regimen with dual antiplatelet therapy for at least 1 year, cardiac rehab for 3 months, and follow-up with cardiology in 3-6 months.  Cardiology will sign off for now.      Luigi Pyle MD    Interval History   The patient underwent RCA stenting yesterday successfully.  He has no further complaints or symptoms.  He is hemodynamically and electrically stable.    Physical Exam       BP: 102/66   Heart Rate: 75 Resp: 19 SpO2: 98 % O2 Device: None (Room air)    Vitals:    03/03/18 0600   Weight: 88.8 kg (195 lb 11.2 oz)     Vital Signs with Ranges  Temp:  [97.9  F (36.6  C)] 97.9  F (36.6  C)  Heart Rate:  [52-75] 75  Resp:  [8-19] 19  BP: (102-124)/(65-97) 102/66  SpO2:  [95 %-99 %] 98 %  I/O last 3 completed shifts:  In: 550 [P.O.:550]  Out: 1100 [Urine:1100]    Constitutional: awake, alert, cooperative, no apparent distress, and appears stated age  Respiratory: No increased work of breathing, good air exchange, clear to auscultation bilaterally, no crackles or wheezing  Cardiovascular: Normal apical impulse, regular rate and rhythm, normal S1 and S2, no S3 or S4, and no murmur noted  GI: No scars, normal bowel sounds, soft, non-distended, non-tender, no masses palpated, no hepatosplenomegally  Skin: normal skin color, texture, turgor  Musculoskeletal: no lower extremity pitting edema present    Medications     Percutaneous Coronary Intervention orders placed  (this is information for BPA alerting)       Reason ACE/ARB/ARNI order not selected         atorvastatin  80 mg Oral Daily     sodium chloride (PF)  3 mL Intracatheter Q8H     aspirin EC  81 mg Oral Daily     ticagrelor  90 mg Oral Q12H     metoprolol succinate  25 mg Oral Daily     omeprazole  20 mg Oral QAM AC       Data   Results for orders placed or performed during the hospital encounter of 03/02/18 (from the past 24 hour(s))   Activated clotting time POCT   Result Value Ref Range    Activated Clot Time 254 (H) 75 - 150 sec   Activated clotting time POCT   Result Value Ref Range    Activated Clot Time 242 (H) 75 - 150 sec   Activated clotting time POCT   Result Value Ref Range    Activated Clot Time 361 (H) 75 - 150 sec   Activated clotting time POCT   Result Value Ref Range    Activated Clot Time 272 (H) 75 - 150 sec   Activated clotting time POCT   Result Value Ref Range    Activated Clot Time 248 (H) 75 - 150 sec   Heart Cath Left heart cath    Narrative    Procedures:  Coronary Angiography  PCI of the RCA  Left heart catheterization    PHYSICIANS:  Attending Physician: Dr. Belcher  Interventional Cardiology Fellow: JASON Hernandez    HPI/INDICATION:  The patient is a 62-year-old male with no prior history of heart  disease. He had sudden onset of diaphoresis and chest pain after  waking up this morning. He tried taking Tums because of questionable  reflux disease however this did not help. He presented to the [facility]  emergency room and had some T wave changes in the inferior leads and a  positive troponin, treated as an NSTEMI. He was sent to the Cath Lab  for coronary angiography with revascularization.    DESCRIPTION:  1. Consent obtained with discussion of risks. All questions were  answered.  2. Sterile prep and procedure.  3. Location with Sheaths:   6 Urdu right radial artery  4. Access: Local anesthetic with lidocaine. A micropuncture 21 gauge  needle  was used to establish vascular access using a  modified  Seldinger technique.  5. Catheters:   Tig, JR4 guide, AR2 guide  6. Estimated blood loss: < 25 ml    MEDICATIONS:  The procedure was performed under conscious sedation for 161?minutes  from 1709 to 1950.  The patient was assessed immediately before the first sedation  medication was administered. Midazolam 5mg and Fentanyl 200mcg?were  administered.  Heart rate, BP, respiration, oxygen saturation and patient responses  were monitored throughout the procedure with the assistance of the RN  under my supervision.  IV Heparin was administered to achieve anticoagulation.  Antiplatelet Therapy: ASA and Ticagrelor    Procedures:  Coronary Angiogram:   -Both coronary arteries arise from their respective cusps.  -Dominance: Right  -LM has a 10-20% midsegment stenosis.  -LAD: Type 3 [LAD supplies the entire apex]. The LAD gives rise to  septal perforators, D1 and D2. The proximal LAD is tortuous and has  20-30% disease.  -LCX gives rise to OM1, OM2, OM 3. The circumflex, after the OM1 has a  50-60% stenosis. There is a trifurcation lesion of the circumflex, OM  2, and OM 3 with 60-70% stenosis.   -RCA gives rise to PL branches and supplies PDA. The RCA is a very  tortuous vessel. The mid RCA has to severe lesions, the first is an  80% stenosis, and the second is a 95% stenosis. Distal RCA has mild  disease.    PCI:  Mid RCA  JR4 and AR 2 guide    Initially, we used a JR4 guide and a run through wire and we were able  to cross the mid RCA stenoses, however given the tortuosity it was  difficult to get the wire positioned in the distal RCA system. We then  used a  50, and were able to cross the mid RCA lesions and  position the wire in the distal vessel. We used a guideliner to  deliver predilation balloons. We predilated the distal lesion with a  2.5 x 15 mm emerge balloon. We then dilated the proximal lesion with  the same 2.5 x 15 mm emerge balloon. Next we predilated the proximal  lesion with a 2.75 x 12 mm  NC emerge balloon. We attempted to deliver  a 2.75 x 32 Synergy stent to the distal lesion, however we were  unsuccessful. We predilated once again with a 2.5 x 12 mm NC emerge  balloon. Then we attempted to deliver a 3.0 x 12 mm shayna MARIELLE to the  distal lesion, but we were unsuccessful. Because we did not have  adequate support remain the decision to switch to an AR 2 guide. We  were able to re-wire the lesion and positioned a run through in the  distal RCA. We used a second run through wire as a álvaro wire and then  we were able to deliver the 3.0 x 12 mm [place] MARIELLE to the distal lesion,  inflated to 12 manuela. Because of tortuosity we felt delivering long  stents would be unsuccessful. Next, we placed a 3.5 x 18 mm shayna MARIELLE  overlapping and proximal to the first stent, inflated to 14 manuela. A 4.0  x 12 mm Shayna MARIELLE was placed overlapping and proximal to the second  stent, inflated to 12 manuela. We were unable to deliver a larger balloon,  so we postdilated with a 3.0 x 12 mm compliant emerge balloon to 20  manuela. Then we used a 3.5 x 20 mm NC emerge balloon to post dilate the  proximal stent as well as the overlapping segment with inflation to 20  manuela. Angiography at this time revealed a proximal edge dissection.  Because we are unable to deliver 4.0 stent, we again had to place a  body wire. This enabled us to deliver a 4.0 x 15 mm shayna MARIELLE  overlapping with the third stent, covering the dissection, and  extending back to the ostium of the RCA with inflation to 12 manuela.  Postdilated the overlapping stent segment with the stent balloon.  Final angiography showed no evidence of dissection of perforation with  minimal residual stenosis.    Left heart catheterization:  LV 93/11  No gradient on pullback across aortic valve    Sheath Removal:  Right radial artery sheath removed, TR band in place    Contrast: 280ml     Fluoroscopy Time: 75.7min; 1.981Gy    COMPLICATIONS:  None    SUMMARY:   Two vessel CAD  PCI of the RCA  Borderline  obstructive disease in the circumflex    PLAN:   ASA 81 mg qd indefinitely and Ticagrelor 90 mg BID uninterrupted for  one year  Initiated beta blocker and high-dose statin  Continued medical management and lifestyle modification for  cardiovascular risk factor optimization.    Can consider outpatient evaluation for circumflex disease with either  planned PCI or stress test as clinically appropriate.    The attending interventional cardiologist, Dr. Belcher, was present and  supervised all critical aspects the procedure.    JASON Hernandez MD, PhD  Interventional Cardiology Fellow   Basic metabolic panel   Result Value Ref Range    Sodium 140 133 - 144 mmol/L    Potassium 4.0 3.4 - 5.3 mmol/L    Chloride 107 94 - 109 mmol/L    Carbon Dioxide 28 20 - 32 mmol/L    Anion Gap 5 3 - 14 mmol/L    Glucose 80 70 - 99 mg/dL    Urea Nitrogen 16 7 - 30 mg/dL    Creatinine 0.99 0.66 - 1.25 mg/dL    GFR Estimate 77 >60 mL/min/1.7m2    GFR Estimate If Black >90 >60 mL/min/1.7m2    Calcium 8.6 8.5 - 10.1 mg/dL   CBC with platelets   Result Value Ref Range    WBC 8.4 4.0 - 11.0 10e9/L    RBC Count 5.02 4.4 - 5.9 10e12/L    Hemoglobin 14.9 13.3 - 17.7 g/dL    Hematocrit 44.0 40.0 - 53.0 %    MCV 88 78 - 100 fl    MCH 29.7 26.5 - 33.0 pg    MCHC 33.9 31.5 - 36.5 g/dL    RDW 13.5 10.0 - 15.0 %    Platelet Count 241 150 - 450 10e9/L

## 2018-03-03 NOTE — PLAN OF CARE
Problem: Cardiac: ACS (Acute Coronary Syndrome) (Adult)  Goal: Signs and Symptoms of Listed Potential Problems Will be Absent, Minimized or Managed (Cardiac: ACS)  Signs and symptoms of listed potential problems will be absent, minimized or managed by discharge/transition of care (reference Cardiac: ACS (Acute Coronary Syndrome) (Adult) CPG).  Outcome: Adequate for Discharge Date Met: 03/03/18  VSS. No c/o pain. IV's were removed. Pt belongings are accounted for. Discharge education complete with emphasis on new cardiac medications and post-angioplasty education. No further questions at this time. New prescriptions were filled at pharmacy and given to patient prior to discharge. No further questions at this time. To exit w/ family.

## 2018-03-03 NOTE — H&P
[facility]    History and Physical  Hospitalist       Date of Admission:  3/2/2018    Assessment & Plan   Gil Ordonez is a 62 year old male with past history GERD who presents with chest pain, found to have NSTEMI and transferred to [facility] for angiogram.    NSTEMI:  Presented with substernal chest tightness associated with diaphoresis, EKG with non-specific T-wave changes unchanged from prior but troponin mildly elevated and trending up.  He is now s/p PCI to RCA per report (formal angio report pending).  TTE 3/2 with EF 55-60% with no WMA.  - continue aspirin, atorvastatin, metoprolol, Brilinta as ordered by Cardiology  - Cardiac rehab    GERD:  Continue PTA omeprazole.    DVT Prophylaxis: Pneumatic Compression Devices  Code Status: Full Code    Disposition: Expected discharge tomorrow days once cleared by Cardiac rehab and Cardiology.    Wallace Aceves    Primary Care Physician   Fabian Collazo    Chief Complaint   Chest pain    History is obtained from the patient, discussion with outside hospitalist and chart review    History of Present Illness   Gil Ordonez is a 62 year old male who presents with chest pain.  Patient reports episode of substernal chest pain which he described as tightness associated with diaphoresis this morning.  He denies any shortness of breath, nausea, palpitations or lightheadedness.  It was not clearly exertional.  He denies any other palliative or provocative factors.  He presented to [facility] where serial troponin was trending up.  He was assessed by cardiology and offered urgent coronary angiogram, which he elected to pursue.  He was transferred to [facility] for angiogram and is currently status post PCI, with RN reporting four stents placed to the RCA.  The patient currently denies any chest pain or pressure, shortness of breath and otherwise has a negative remainder of review of systems.    Past Medical History    GERD    Past  Surgical History   Spinal discectomy approximately six weeks ago  Bilateral TKA    Prior to Admission Medications   Prior to Admission Medications   Prescriptions Last Dose Informant Patient Reported? Taking?   calcium carbonate (TUMS) 500 MG chewable tablet   Yes Yes   Sig: Take 2 chew tab by mouth as needed for heartburn   fish oil-omega-3 fatty acids 1000 MG capsule   Yes Yes   Sig: Take 2 g by mouth daily   multivitamin, therapeutic with minerals (THERA-VIT-M) TABS tablet 3/2/2018 at Unknown time  Yes Yes   Sig: Take 1 tablet by mouth daily   omeprazole 20 MG tablet 3/2/2018 at Unknown time  Yes Yes   Sig: Take 20 mg by mouth daily      Facility-Administered Medications: None     Allergies   No Known Allergies    Social History   I have personally reviewed the social history with the patient showing no history of tobacco use, he reports drinking alcohol approximately once weekly.    Family History   Father with myocardial infarction at age of 85.    Review of Systems   The 10 point Review of Systems is negative other than noted in the HPI or here.     Physical Exam       BP: 121/78   Heart Rate: 52 Resp: 18        Vital Signs with Ranges  Temp:  [97.5  F (36.4  C)-97.9  F (36.6  C)] 97.9  F (36.6  C)  Heart Rate:  [52-78] 52  Resp:  [8-21] 18  BP: (110-159)/() 121/78  SpO2:  [95 %-100 %] 95 %  0 lbs 0 oz    Constitutional: well developed, well nourished male in no acute distress  Eyes: pupils equal, round and reactive to light, no icterus  HEENT: head normocephalic, atraumatic, mucous membranes moist, no cervical lymphadenopathy  Respiratory: lungs clear to auscultation bilaterally, no crackles or wheezes, no tachypnea  Cardiovascular: regular rate and rhythm, normal S1/S2, no murmur, rubs or gallops  GI: abdomen soft, non-tender, non-distended, normal bowel sounds  Lymph/Hematologic: No peripheral edema  Musculoskeletal: Extremities warm and well perfused  Neurologic: alert and appropriate, cranial nerves  grossly intact, gross motor movements intact  Psychiatric: normal affect    Data   Data reviewed today:  I personally reviewed no images or EKG's today.    Recent Labs  Lab 03/02/18  1355 03/02/18  1251 03/02/18  1005 03/02/18  0648 03/02/18  0610   WBC  --  5.6  --   --  6.0   HGB  --  14.5  --   --  15.5   MCV  --  89  --   --  88   PLT  --  282  --   --  314   NA  --   --   --   --  141   POTASSIUM  --   --   --   --  4.0   CHLORIDE  --   --   --   --  107   CO2  --   --   --   --  31   BUN  --   --   --   --  20   CR  --   --   --   --  0.99   ANIONGAP  --   --   --   --  3   ELIS  --   --   --   --  9.3   GLC  --   --   --   --  92   TROPI 0.084*  --  0.059*  --   --    TROPONIN  --   --   --  0.01  --        Recent Results (from the past 24 hour(s))   XR Chest Port 1 View    Narrative    XR CHEST PORT 1 VW 3/2/2018 7:26 AM    HISTORY: Chest pain.    COMPARISON: None.    FINDINGS: No airspace consolidation, pleural effusion or pneumothorax.  Normal heart size.      Impression    IMPRESSION: No acute cardiopulmonary abnormality.    LAURO CANTRELL MD

## 2018-03-03 NOTE — PLAN OF CARE
Problem: Patient Care Overview  Goal: Plan of Care/Patient Progress Review  Outcome: Improving  vss overnight, no compliants of pain or discomfort. Tr band needed an additional 3mls of air in as small hematoma  formed and bleeding was an issue. Hematoma resolved with manual pressure and air released slowly overnight. Remains a&o, has been eating and drinking and passing urine overnight. No new issues overnight. Tele sr, will continue to monitor.

## 2018-03-03 NOTE — PROVIDER NOTIFICATION
MD Notification    Notified Person:  MD    Notified Persons Name: Dr Rendon    Notification Date/Time:03/02/18 22.45    Notification Interaction:  Talked with Physician    Purpose of Notification: 13 beats of self terminating vtach. Asymptomatic, most likely reperfusion but just to make DR aware.    Orders Received: continue to monitor.    Comments:

## 2018-03-03 NOTE — DISCHARGE INSTRUCTIONS
Cardiac Angiogram Discharge Instructions - Radial    After you go home:      Have an adult stay with you until tomorrow.    Drink extra fluids for 2 days.    You may resume your normal diet.    No smoking       For 24 hours - due to the sedation you received:    Relax and take it easy.    Do NOT make any important or legal decisions.    Do NOT drive or operate machines at home or at work.    Do NOT drink alcohol.    Care of Wrist Puncture Site:      For the first 24 hrs - check the puncture site every 1-2 hours while awake.    It is normal to have soreness at the puncture site and mild tingling in your hand for up to 3 days.    Remove the bandaid after 24 hours. If there is minor oozing, apply another bandaid and remove it after 12 hours.    You may shower tomorrow.  Do NOT take a bath, or use a hot tub or pool for at least 3 days. Do NOT scrub the site. Do not use lotion or powder near the puncture site.           Activity:        For 2 days:     do not use your hand or arm to support your weight (such as rising from a chair)     do not bend your wrist (such as lifting a garage door).    do not lift more than 5 pounds or exercise your arm (such as tennis, golf or bowling).    Do NOT do any heavy activity such as exercise, lifting, or straining.     Bleeding:      If you start bleeding from the site in your wrist, sit down and press firmly on/above the site for 10 minutes.     Once bleeding stops, keep arm still for 2 hours.     Call [facility] as soon as you can.       Call 911 right away if you have heavy bleeding or bleeding that does not stop.      Medicines:      If you are taking antiplatelet medications such as Plavix, Brilinta or Effient, do not stop taking it until you talk to your cardiologist.        If you are on Metformin (Glucophage), do not restart it until you have blood tests (within 2 to 3 days after discharge).  After you have your blood drawn, you may restart the Metformin.     Take your  medications, including blood thinners, unless your provider tells you not to.  If you take Coumadin (Warfarin), have your INR checked by your provider in  3-5 days. Call your clinic to schedule this.    If you have stopped any medicines, check with your provider about when to restart them.    Follow Up Appointments:    Follow up with [facility] Nurse Practitioner at [facility] of patient preference in 7-10 days.  If you do not hear from [facility] by Monday afternoon please call [phone] to set up this appointment.  It is very important to go to it--bring these papers and your insurance card with you.  At the visit, talk about your hospital stay.  Tell your doctor how you feel.  Show your new list of medications.  Your doctor will update records, make sure you are still doing OK, and decide if any tests or medication changes are needed.          Call the clinic if:      You have a large or growing hard lump around the site.    The site is red, swollen, hot or tender.    Blood or fluid is draining from the site.    You have chills or a fever greater than 101 F (38 C).    Your arm turns feels numb, cool or changes color.    You have hives, a rash or unusual itching.    Any questions or concerns.          [facility] [place]:    [phone] [facility] (7 days a week)

## 2018-03-03 NOTE — PROGRESS NOTES
03/03/18 1037   Quick Adds   Type of Visit Initial Occupational Therapy Evaluation   Living Environment   Lives With spouse   Living Arrangements house   Number of Stairs to Enter Home 1   Number of Stairs Within Home 13  (up and down)   Living Environment Comment Pt works as .    Self-Care   Usual Activity Tolerance excellent   Current Activity Tolerance good   Regular Exercise yes   Activity/Exercise/Self-Care Comment Typically very active. Less active over last 6 weeks due to spinal surgery/precautions.    Functional Level Prior   Ambulation 0-->independent   Transferring 0-->independent   Toileting 0-->independent   Bathing 0-->independent   Dressing 0-->independent   Eating 0-->independent   Which of the above functional risks had a recent onset or change? none   Prior Functional Level Comment Pt IND with ADLs, spouse completes IADLs due to spinal precautions (no lifted)   General Information   Onset of Illness/Injury or Date of Surgery - Date 03/02/18   Referring Physician David   Patient/Family Goals Statement to d/c home today.    Additional Occupational Profile Info/Pertinent History of Current Problem Pt is a 62 year old male with past history GERD who presents with chest pain, found to have NSTEMI and transferred to [facility] for angiogram. Pt s/p PCI to RCA   General Observations Pt and spouse agreeable to OT/CR   General Info Comments R radial site   Cognitive Status Examination   Orientation orientation to person, place and time   Level of Consciousness alert   Able to Follow Commands WNL/WFL   Pain Assessment   Patient Currently in Pain (minimal R radial site)   Mobility   Bed Mobility Comments IND   Transfer Skill: Bed to Chair/Chair to Bed   Level of [place]: Bed to Chair independent   Transfer Skill: Sit to Stand   Level of [place]: Sit/Stand independent   Lower Body Dressing   Level of [place]: Dress Lower Body independent   Activities of Daily Living Analysis  "  Impairments Contributing to Impaired Activities of Daily Living strength decreased   Clinical Impression   Criteria for Skilled Therapeutic Interventions Met yes, treatment indicated   OT Diagnosis IADLs/functional activity tolerance   Influenced by the following impairments Impaired CV strength   Assessment of Occupational Performance 1-3 Performance Deficits   Identified Performance Deficits IADLs and functional activity tolerance   Clinical Decision Making (Complexity) Low complexity   Therapy Frequency 2 times/day   Predicted Duration of Therapy Intervention (days/wks) 1 day   Anticipated Discharge Disposition Home with Outpatient Therapy;Home with Assist   Risks and Benefits of Treatment have been explained. Yes   Patient, Family & other staff in agreement with plan of care Yes   [facility] TM \"6 Clicks\"   2016, Trustees of [facility], under license to [employer].  All rights reserved.   6 Clicks Short Forms Daily Activity Inpatient Short Form   [facility]  \"6 Clicks\" Daily Activity Inpatient Short Form   1. Putting on and taking off regular lower body clothing? 4 - None   2. Bathing (including washing, rinsing, drying)? 4 - None   3. Toileting, which includes using toilet, bedpan or urinal? 4 - None   4. Putting on and taking off regular upper body clothing? 4 - None   5. Taking care of personal grooming such as brushing teeth? 4 - None   6. Eating meals? 4 - None   Daily Activity Raw Score (Score out of 24.Lower scores equate to lower levels of function) 24   Total Evaluation Time   Total Evaluation Time (Minutes) 8     "

## 2018-03-06 LAB — INTERPRETATION ECG - MUSE: NORMAL

## 2018-03-15 ENCOUNTER — HOSPITAL ENCOUNTER (OUTPATIENT)
Dept: CARDIAC REHAB | Facility: CLINIC | Age: 63
End: 2018-03-15
Payer: COMMERCIAL

## 2018-03-15 DIAGNOSIS — Z98.61 POSTSURGICAL PERCUTANEOUS TRANSLUMINAL CORONARY ANGIOPLASTY STATUS: ICD-10-CM

## 2018-03-15 PROCEDURE — 93798 PHYS/QHP OP CAR RHAB W/ECG: CPT

## 2018-03-15 PROCEDURE — 40000116 ZZH STATISTIC OP CR VISIT

## 2018-03-21 ENCOUNTER — HOSPITAL ENCOUNTER (OUTPATIENT)
Dept: CARDIAC REHAB | Facility: CLINIC | Age: 63
End: 2018-03-21
Payer: COMMERCIAL

## 2018-03-21 PROCEDURE — 93798 PHYS/QHP OP CAR RHAB W/ECG: CPT

## 2018-03-21 PROCEDURE — 40000116 ZZH STATISTIC OP CR VISIT

## 2018-03-23 ENCOUNTER — HOSPITAL ENCOUNTER (OUTPATIENT)
Dept: CARDIAC REHAB | Facility: CLINIC | Age: 63
End: 2018-03-23
Payer: COMMERCIAL

## 2018-03-23 PROCEDURE — 93798 PHYS/QHP OP CAR RHAB W/ECG: CPT | Performed by: REHABILITATION PRACTITIONER

## 2018-03-23 PROCEDURE — 40000116 ZZH STATISTIC OP CR VISIT: Performed by: REHABILITATION PRACTITIONER

## 2018-03-26 ENCOUNTER — HOSPITAL ENCOUNTER (OUTPATIENT)
Dept: CARDIAC REHAB | Facility: CLINIC | Age: 63
End: 2018-03-26
Payer: COMMERCIAL

## 2018-03-26 PROCEDURE — 40000116 ZZH STATISTIC OP CR VISIT

## 2018-03-26 PROCEDURE — 93798 PHYS/QHP OP CAR RHAB W/ECG: CPT

## 2018-03-29 ENCOUNTER — HOSPITAL ENCOUNTER (OUTPATIENT)
Dept: CARDIAC REHAB | Facility: CLINIC | Age: 63
End: 2018-03-29
Payer: COMMERCIAL

## 2018-03-29 PROCEDURE — 40000116 ZZH STATISTIC OP CR VISIT: Performed by: REHABILITATION PRACTITIONER

## 2018-03-29 PROCEDURE — 93798 PHYS/QHP OP CAR RHAB W/ECG: CPT | Performed by: REHABILITATION PRACTITIONER

## 2018-04-03 ENCOUNTER — HOSPITAL ENCOUNTER (OUTPATIENT)
Dept: CARDIAC REHAB | Facility: CLINIC | Age: 63
End: 2018-04-03
Payer: COMMERCIAL

## 2018-04-03 PROCEDURE — 93798 PHYS/QHP OP CAR RHAB W/ECG: CPT | Performed by: REHABILITATION PRACTITIONER

## 2018-04-03 PROCEDURE — 40000116 ZZH STATISTIC OP CR VISIT: Performed by: REHABILITATION PRACTITIONER

## 2018-04-06 ENCOUNTER — HOSPITAL ENCOUNTER (OUTPATIENT)
Dept: CARDIAC REHAB | Facility: CLINIC | Age: 63
End: 2018-04-06
Payer: COMMERCIAL

## 2018-04-06 PROCEDURE — 40000116 ZZH STATISTIC OP CR VISIT: Performed by: OCCUPATIONAL THERAPIST

## 2018-04-06 PROCEDURE — 93798 PHYS/QHP OP CAR RHAB W/ECG: CPT | Performed by: OCCUPATIONAL THERAPIST

## 2018-04-11 ENCOUNTER — HOSPITAL ENCOUNTER (OUTPATIENT)
Dept: CARDIAC REHAB | Facility: CLINIC | Age: 63
End: 2018-04-11
Payer: COMMERCIAL

## 2018-04-11 PROCEDURE — 93798 PHYS/QHP OP CAR RHAB W/ECG: CPT | Performed by: REHABILITATION PRACTITIONER

## 2018-04-11 PROCEDURE — 40000116 ZZH STATISTIC OP CR VISIT: Performed by: REHABILITATION PRACTITIONER

## 2018-04-13 ENCOUNTER — HOSPITAL ENCOUNTER (OUTPATIENT)
Dept: CARDIAC REHAB | Facility: CLINIC | Age: 63
End: 2018-04-13
Payer: COMMERCIAL

## 2018-04-13 PROCEDURE — 40000116 ZZH STATISTIC OP CR VISIT

## 2018-04-13 PROCEDURE — 93798 PHYS/QHP OP CAR RHAB W/ECG: CPT

## 2018-04-16 ENCOUNTER — HOSPITAL ENCOUNTER (OUTPATIENT)
Dept: CARDIAC REHAB | Facility: CLINIC | Age: 63
End: 2018-04-16
Payer: COMMERCIAL

## 2018-04-16 PROCEDURE — 40000116 ZZH STATISTIC OP CR VISIT

## 2018-04-16 PROCEDURE — 93798 PHYS/QHP OP CAR RHAB W/ECG: CPT

## 2018-04-18 ENCOUNTER — HOSPITAL ENCOUNTER (OUTPATIENT)
Dept: CARDIAC REHAB | Facility: CLINIC | Age: 63
End: 2018-04-18
Payer: COMMERCIAL

## 2018-04-18 PROCEDURE — 40000116 ZZH STATISTIC OP CR VISIT

## 2018-04-18 PROCEDURE — 93798 PHYS/QHP OP CAR RHAB W/ECG: CPT

## 2021-10-25 ENCOUNTER — HOSPITAL ENCOUNTER (EMERGENCY)
Facility: CLINIC | Age: 66
Discharge: HOME | End: 2021-10-25
Attending: EMERGENCY MEDICINE | Admitting: EMERGENCY MEDICINE
Payer: COMMERCIAL

## 2021-10-25 VITALS
OXYGEN SATURATION: 99 % | DIASTOLIC BLOOD PRESSURE: 101 MMHG | RESPIRATION RATE: 18 BRPM | SYSTOLIC BLOOD PRESSURE: 149 MMHG | TEMPERATURE: 97.9 F | HEART RATE: 72 BPM

## 2021-10-25 DIAGNOSIS — M54.6 ACUTE RIGHT-SIDED THORACIC BACK PAIN: ICD-10-CM

## 2021-10-25 LAB
ANION GAP SERPL CALCULATED.3IONS-SCNC: 6 MMOL/L (ref 3–14)
BASOPHILS # BLD AUTO: 0 10E3/UL (ref 0–0.2)
BASOPHILS NFR BLD AUTO: 1 %
BUN SERPL-MCNC: 30 MG/DL (ref 7–30)
CALCIUM SERPL-MCNC: 8.4 MG/DL (ref 8.5–10.1)
CHLORIDE BLD-SCNC: 109 MMOL/L (ref 94–109)
CO2 SERPL-SCNC: 25 MMOL/L (ref 20–32)
CREAT SERPL-MCNC: 1.04 MG/DL (ref 0.66–1.25)
EOSINOPHIL # BLD AUTO: 0.2 10E3/UL (ref 0–0.7)
EOSINOPHIL NFR BLD AUTO: 2 %
ERYTHROCYTE [DISTWIDTH] IN BLOOD BY AUTOMATED COUNT: 12.6 % (ref 10–15)
GFR SERPL CREATININE-BSD FRML MDRD: 76 ML/MIN/1.73M2
GLUCOSE BLD-MCNC: 97 MG/DL (ref 70–99)
HCT VFR BLD AUTO: 46.1 % (ref 40–53)
HGB BLD-MCNC: 14.8 G/DL (ref 13.3–17.7)
IMM GRANULOCYTES # BLD: 0 10E3/UL
IMM GRANULOCYTES NFR BLD: 0 %
LYMPHOCYTES # BLD AUTO: 1.7 10E3/UL (ref 0.8–5.3)
LYMPHOCYTES NFR BLD AUTO: 23 %
MCH RBC QN AUTO: 29.4 PG (ref 26.5–33)
MCHC RBC AUTO-ENTMCNC: 32.1 G/DL (ref 31.5–36.5)
MCV RBC AUTO: 92 FL (ref 78–100)
MONOCYTES # BLD AUTO: 0.6 10E3/UL (ref 0–1.3)
MONOCYTES NFR BLD AUTO: 8 %
NEUTROPHILS # BLD AUTO: 5 10E3/UL (ref 1.6–8.3)
NEUTROPHILS NFR BLD AUTO: 66 %
NRBC # BLD AUTO: 0 10E3/UL
NRBC BLD AUTO-RTO: 0 /100
PLATELET # BLD AUTO: 275 10E3/UL (ref 150–450)
POTASSIUM BLD-SCNC: 4.1 MMOL/L (ref 3.4–5.3)
RBC # BLD AUTO: 5.04 10E6/UL (ref 4.4–5.9)
SODIUM SERPL-SCNC: 140 MMOL/L (ref 133–144)
TROPONIN I SERPL-MCNC: <0.015 UG/L (ref 0–0.04)
WBC # BLD AUTO: 7.6 10E3/UL (ref 4–11)

## 2021-10-25 PROCEDURE — 93005 ELECTROCARDIOGRAM TRACING: CPT

## 2021-10-25 PROCEDURE — 250N000011 HC RX IP 250 OP 636: Performed by: EMERGENCY MEDICINE

## 2021-10-25 PROCEDURE — 36415 COLL VENOUS BLD VENIPUNCTURE: CPT | Performed by: EMERGENCY MEDICINE

## 2021-10-25 PROCEDURE — 80048 BASIC METABOLIC PNL TOTAL CA: CPT | Performed by: EMERGENCY MEDICINE

## 2021-10-25 PROCEDURE — 85041 AUTOMATED RBC COUNT: CPT | Performed by: EMERGENCY MEDICINE

## 2021-10-25 PROCEDURE — 96374 THER/PROPH/DIAG INJ IV PUSH: CPT

## 2021-10-25 PROCEDURE — 84484 ASSAY OF TROPONIN QUANT: CPT | Performed by: EMERGENCY MEDICINE

## 2021-10-25 PROCEDURE — 99284 EMERGENCY DEPT VISIT MOD MDM: CPT | Mod: 25

## 2021-10-25 PROCEDURE — 250N000013 HC RX MED GY IP 250 OP 250 PS 637: Performed by: EMERGENCY MEDICINE

## 2021-10-25 RX ORDER — CYCLOBENZAPRINE HCL 10 MG
10 TABLET ORAL 3 TIMES DAILY PRN
Qty: 18 TABLET | Refills: 0 | Status: SHIPPED | OUTPATIENT
Start: 2021-10-25 | End: 2021-10-31

## 2021-10-25 RX ORDER — CYCLOBENZAPRINE HCL 10 MG
10 TABLET ORAL ONCE
Status: COMPLETED | OUTPATIENT
Start: 2021-10-25 | End: 2021-10-25

## 2021-10-25 RX ORDER — LIDOCAINE 4 G/G
1 PATCH TOPICAL ONCE
Status: DISCONTINUED | OUTPATIENT
Start: 2021-10-25 | End: 2021-10-25 | Stop reason: HOSPADM

## 2021-10-25 RX ORDER — KETOROLAC TROMETHAMINE 15 MG/ML
15 INJECTION, SOLUTION INTRAMUSCULAR; INTRAVENOUS ONCE
Status: COMPLETED | OUTPATIENT
Start: 2021-10-25 | End: 2021-10-25

## 2021-10-25 RX ADMIN — LIDOCAINE 1 PATCH: 246 PATCH TOPICAL at 20:51

## 2021-10-25 RX ADMIN — CYCLOBENZAPRINE HYDROCHLORIDE 10 MG: 10 TABLET, FILM COATED ORAL at 20:05

## 2021-10-25 RX ADMIN — KETOROLAC TROMETHAMINE 15 MG: 15 INJECTION, SOLUTION INTRAMUSCULAR; INTRAVENOUS at 20:06

## 2021-10-25 ASSESSMENT — ENCOUNTER SYMPTOMS
CHILLS: 0
FEVER: 0
BACK PAIN: 1
DYSURIA: 0
WEAKNESS: 0
NUMBNESS: 0
HEMATURIA: 0
DIARRHEA: 0
COUGH: 0
SHORTNESS OF BREATH: 1
ABDOMINAL PAIN: 0

## 2021-10-25 NOTE — ED TRIAGE NOTES
Right flank/rib pain. Played golf recently, might have aggravated himself. Certain movements cause sharp pains. Painful to take deep breaths

## 2021-10-26 LAB
ATRIAL RATE - MUSE: 60 BPM
DIASTOLIC BLOOD PRESSURE - MUSE: NORMAL MMHG
INTERPRETATION ECG - MUSE: NORMAL
P AXIS - MUSE: -7 DEGREES
PR INTERVAL - MUSE: 150 MS
QRS DURATION - MUSE: 86 MS
QT - MUSE: 410 MS
QTC - MUSE: 410 MS
R AXIS - MUSE: 13 DEGREES
SYSTOLIC BLOOD PRESSURE - MUSE: NORMAL MMHG
T AXIS - MUSE: -13 DEGREES
VENTRICULAR RATE- MUSE: 60 BPM

## 2021-10-26 NOTE — ED PROVIDER NOTES
History   Chief Complaint:  Right back/rib wall pain       HPI   Gil Ordonez is a 65 year old male with history of hypertension who presents with back pain. The patient states that he has been experiencing right sided thoracic pain for the past 2 days. He was golfing 2 days ago and noticed a sharp pain and was concerned for a pulled muscle. Today he was able to ambulate fine and went golfing. When he was putting he noticed a sharp shooting pain, prompting his ED visit. He states that certain positions worsens the pain and he also experiences mild shortness of breath associated with the pain. He attempted to take ibuprofen with no relief. He mentions having low back surgery in 2018 for nerve pain but this pain does not feel similar. Denies any numbness, weakness, abdominal pain, dysuria, diarrhea, constipation, fevers, chills, hematuria, chest pain, cough, rash, or leg swelling. Also denies and recent travel or history of blood clots. The patient is on Plavix.     Review of Systems   Constitutional: Negative for chills and fever.   Respiratory: Positive for shortness of breath. Negative for cough.    Cardiovascular: Negative for chest pain and leg swelling.   Gastrointestinal: Negative for abdominal pain and diarrhea.   Genitourinary: Negative for dysuria and hematuria.   Musculoskeletal: Positive for back pain.   Skin: Negative for rash.   Neurological: Negative for weakness and numbness.   All other systems reviewed and are negative.      Allergies:  The patient has no known allergies.     Medications:  Atorvastatin   Toprol XL   Nitrostat   Brilinta   Omeprazole   Flexeril   Plavix     Past Medical History:     NSTEMI   ACS   Chronic knee pain   Sleep apnea   Dyslipidemia   Hypertension   Polyp of colon     Past Surgical History:    Bilateral knee arthoplasty   Lumbar laminectomy   Coronary stent placement     Social History:  Patient presents with his wife.     Physical Exam     Patient Vitals for the  past 24 hrs:   BP Temp Temp src Pulse Resp SpO2   10/25/21 1608 (!) 149/101 97.9  F (36.6  C) Temporal 72 18 99 %       Physical Exam  Constitutional: Well appearing.  HEENT: Atraumatic. Moist mucous membranes.  Neck: Soft.  Supple.   Cardiac: Regular rate and rhythm.  No murmur or rub.  Respiratory: Clear to auscultation bilaterally.  No respiratory distress.  No wheezing, rhonchi, or rales.  Abdomen: Soft and nontender.  No rebound or guarding.  Nondistended.  Musculoskeletal: There is reproducible tenderness of the right paraspinal muscle area of the mid to lower back.  No visible bruising, swelling, or rash noted.  No edema.  Normal range of motion.  Neurologic: Alert and oriented x3.  Normal tone and bulk.  5 out of 5 strength in lower extremities.  Sensation to light touch intact throughout the lower extremities.  Normal gait.  Skin: No rashes.  No edema.  Psych: Normal affect.  Normal behavior.      Emergency Department Course   ECG  ECG obtained at 1615, ECG read at 1617  Normal sinus rhythm   T wave abnormality, consider inferior ischemia   No significant changes as compared to prior, dated 3/3/18.  Rate 60 bpm. KS interval 150 ms. QRS duration 86 ms. QT/QTc 410/410 ms. P-R-T axes -7 13 -13.     Laboratory:  Troponin (Collected 1913): <0.015    BMP: calcium 8.4 (L) o/w WNL (Creatinine 1.04)     CBC: WBC 7.6, HGB 14.8,       Emergency Department Course:  Reviewed:  I reviewed nursing notes, vitals, past medical history and Care Everywhere    Assessments:  1941 I obtained history and examined the patient as noted above.   2031 I rechecked the patient and explained findings.     Interventions:  2006 Torasol, 15 mg, IV  2005 Flexeril, 10 mg, PO    Disposition:  The patient was discharged to home.     Impression & Plan   Medical Decision Making:  Gil Ordonez is a 65-year-old man who is afebrile and hemodynamically stable.  He has very reproducible pain over the right mid back paraspinal muscle area.   There is no visible rash or swelling or bruising noted.  He does not feel short of breath and is not tachycardic to suggest a PE.  I discussed this with him.  I offered further testing such as D-dimer or CT imaging, however, he feels comfortable without it and I think this would likely be low yield at this time.  Also discussed potential imaging to rule out kidney stone versus urine sample to look for hematuria, however, this does not seem consistent with a kidney stone and he is in agreement like to forego that at this time.  Blood work is noted as above.  His EKG demonstrates a normal sinus rhythm with no acute ischemic changes and is unchanged from his last EKG.  His troponin is undetectable.  This excludes ACS with very atypical symptoms.  We discussed most likely muscular strain and supportive care at home.  He was given the above intervention.  I discussed the need to follow-up closely with his primary care physician for further evaluation and treatment and he is understanding.  He will utilize Tylenol and ibuprofen at home and is prescribed Flexeril.  He is also given a Lidoderm patch and will pick some up at his pharmacy if this is helpful for him.  We discussed supportive care at home and return precautions were given.  Their questions were answered and he was in no distress at time of discharge.    Diagnosis:    ICD-10-CM    1. Acute right-sided thoracic back pain  M54.6        Discharge Medications:  New Prescriptions    CYCLOBENZAPRINE (FLEXERIL) 10 MG TABLET    Take 1 tablet (10 mg) by mouth 3 times daily as needed for muscle spasms       Scribe Disclosure:  ARNALDO, Ginger Reilly, am serving as a scribe at 7:26 PM on 10/25/2021 to document services personally performed by Taras Chaparro MD based on my observations and the provider's statements to me.          Taras Chaparro MD  10/26/21 6745

## (undated) RX ORDER — HEPARIN SODIUM 1000 [USP'U]/ML
INJECTION, SOLUTION INTRAVENOUS; SUBCUTANEOUS
Status: DISPENSED
Start: 2018-03-02

## (undated) RX ORDER — VERAPAMIL HYDROCHLORIDE 2.5 MG/ML
INJECTION, SOLUTION INTRAVENOUS
Status: DISPENSED
Start: 2018-03-02

## (undated) RX ORDER — FENTANYL CITRATE 50 UG/ML
INJECTION, SOLUTION INTRAMUSCULAR; INTRAVENOUS
Status: DISPENSED
Start: 2018-03-02

## (undated) RX ORDER — NITROGLYCERIN 5 MG/ML
VIAL (ML) INTRAVENOUS
Status: DISPENSED
Start: 2018-03-02

## (undated) RX ORDER — LIDOCAINE HYDROCHLORIDE 10 MG/ML
INJECTION, SOLUTION EPIDURAL; INFILTRATION; INTRACAUDAL; PERINEURAL
Status: DISPENSED
Start: 2018-03-02